# Patient Record
Sex: MALE | Race: WHITE | NOT HISPANIC OR LATINO | Employment: FULL TIME | URBAN - METROPOLITAN AREA
[De-identification: names, ages, dates, MRNs, and addresses within clinical notes are randomized per-mention and may not be internally consistent; named-entity substitution may affect disease eponyms.]

---

## 2018-09-29 ENCOUNTER — ANESTHESIA (OUTPATIENT)
Dept: SURGERY | Facility: CLINIC | Age: 53
End: 2018-09-29
Payer: COMMERCIAL

## 2018-09-29 ENCOUNTER — SURGERY (OUTPATIENT)
Age: 53
End: 2018-09-29
Payer: COMMERCIAL

## 2018-09-29 ENCOUNTER — HOSPITAL ENCOUNTER (OUTPATIENT)
Facility: CLINIC | Age: 53
Discharge: HOME | End: 2018-09-30
Attending: EMERGENCY MEDICINE | Admitting: SURGERY
Payer: COMMERCIAL

## 2018-09-29 ENCOUNTER — ANESTHESIA EVENT (OUTPATIENT)
Dept: SURGERY | Facility: CLINIC | Age: 53
End: 2018-09-29
Payer: COMMERCIAL

## 2018-09-29 ENCOUNTER — APPOINTMENT (OUTPATIENT)
Dept: CT IMAGING | Facility: CLINIC | Age: 53
End: 2018-09-29
Attending: EMERGENCY MEDICINE
Payer: COMMERCIAL

## 2018-09-29 VITALS
BODY MASS INDEX: 30.38 KG/M2 | DIASTOLIC BLOOD PRESSURE: 74 MMHG | OXYGEN SATURATION: 97 % | HEART RATE: 72 BPM | SYSTOLIC BLOOD PRESSURE: 116 MMHG | RESPIRATION RATE: 14 BRPM | TEMPERATURE: 96.6 F | WEIGHT: 194 LBS

## 2018-09-29 DIAGNOSIS — K35.80 ACUTE APPENDICITIS, UNSPECIFIED ACUTE APPENDICITIS TYPE: ICD-10-CM

## 2018-09-29 DIAGNOSIS — G89.18 ACUTE POST-OPERATIVE PAIN: Primary | ICD-10-CM

## 2018-09-29 LAB
ALBUMIN SERPL-MCNC: 4.1 G/DL (ref 3.4–5)
ALP SERPL-CCNC: 97 U/L (ref 40–150)
ALT SERPL W P-5'-P-CCNC: 26 U/L (ref 0–70)
ANION GAP SERPL CALCULATED.3IONS-SCNC: 7 MMOL/L (ref 3–14)
AST SERPL W P-5'-P-CCNC: 12 U/L (ref 0–45)
BASOPHILS # BLD AUTO: 0 10E9/L (ref 0–0.2)
BASOPHILS NFR BLD AUTO: 0.2 %
BILIRUB SERPL-MCNC: 0.9 MG/DL (ref 0.2–1.3)
BUN SERPL-MCNC: 16 MG/DL (ref 7–30)
CALCIUM SERPL-MCNC: 8.7 MG/DL (ref 8.5–10.1)
CHLORIDE SERPL-SCNC: 103 MMOL/L (ref 94–109)
CO2 SERPL-SCNC: 29 MMOL/L (ref 20–32)
CREAT SERPL-MCNC: 1 MG/DL (ref 0.66–1.25)
DIFFERENTIAL METHOD BLD: ABNORMAL
EOSINOPHIL # BLD AUTO: 0.1 10E9/L (ref 0–0.7)
EOSINOPHIL NFR BLD AUTO: 0.4 %
ERYTHROCYTE [DISTWIDTH] IN BLOOD BY AUTOMATED COUNT: 13.3 % (ref 10–15)
GFR SERPL CREATININE-BSD FRML MDRD: 78 ML/MIN/1.7M2
GLUCOSE SERPL-MCNC: 106 MG/DL (ref 70–99)
HCT VFR BLD AUTO: 47.4 % (ref 40–53)
HGB BLD-MCNC: 16.6 G/DL (ref 13.3–17.7)
IMM GRANULOCYTES # BLD: 0 10E9/L (ref 0–0.4)
IMM GRANULOCYTES NFR BLD: 0.3 %
LACTATE BLD-SCNC: 1.5 MMOL/L (ref 0.7–2)
LYMPHOCYTES # BLD AUTO: 1.2 10E9/L (ref 0.8–5.3)
LYMPHOCYTES NFR BLD AUTO: 9.2 %
MCH RBC QN AUTO: 30 PG (ref 26.5–33)
MCHC RBC AUTO-ENTMCNC: 35 G/DL (ref 31.5–36.5)
MCV RBC AUTO: 86 FL (ref 78–100)
MONOCYTES # BLD AUTO: 0.8 10E9/L (ref 0–1.3)
MONOCYTES NFR BLD AUTO: 6.3 %
NEUTROPHILS # BLD AUTO: 11 10E9/L (ref 1.6–8.3)
NEUTROPHILS NFR BLD AUTO: 83.6 %
NRBC # BLD AUTO: 0 10*3/UL
NRBC BLD AUTO-RTO: 0 /100
PLATELET # BLD AUTO: 187 10E9/L (ref 150–450)
POTASSIUM SERPL-SCNC: 3.8 MMOL/L (ref 3.4–5.3)
PROT SERPL-MCNC: 8 G/DL (ref 6.8–8.8)
RBC # BLD AUTO: 5.54 10E12/L (ref 4.4–5.9)
SODIUM SERPL-SCNC: 139 MMOL/L (ref 133–144)
WBC # BLD AUTO: 13.3 10E9/L (ref 4–11)

## 2018-09-29 PROCEDURE — 25800025 ZZH RX 258: Performed by: SURGERY

## 2018-09-29 PROCEDURE — 36000058 ZZH SURGERY LEVEL 3 EA 15 ADDTL MIN: Performed by: SURGERY

## 2018-09-29 PROCEDURE — 40000935 ZZH STATISTIC OUTPATIENT (NON-OBS) EVE

## 2018-09-29 PROCEDURE — 40000170 ZZH STATISTIC PRE-PROCEDURE ASSESSMENT II: Performed by: SURGERY

## 2018-09-29 PROCEDURE — 25000132 ZZH RX MED GY IP 250 OP 250 PS 637: Performed by: SURGERY

## 2018-09-29 PROCEDURE — 36000056 ZZH SURGERY LEVEL 3 1ST 30 MIN: Performed by: SURGERY

## 2018-09-29 PROCEDURE — 25000128 H RX IP 250 OP 636: Performed by: EMERGENCY MEDICINE

## 2018-09-29 PROCEDURE — 87040 BLOOD CULTURE FOR BACTERIA: CPT | Mod: 91 | Performed by: EMERGENCY MEDICINE

## 2018-09-29 PROCEDURE — 37000008 ZZH ANESTHESIA TECHNICAL FEE, 1ST 30 MIN: Performed by: SURGERY

## 2018-09-29 PROCEDURE — 83605 ASSAY OF LACTIC ACID: CPT | Performed by: EMERGENCY MEDICINE

## 2018-09-29 PROCEDURE — 37000009 ZZH ANESTHESIA TECHNICAL FEE, EACH ADDTL 15 MIN: Performed by: SURGERY

## 2018-09-29 PROCEDURE — 27210794 ZZH OR GENERAL SUPPLY STERILE: Performed by: SURGERY

## 2018-09-29 PROCEDURE — 99285 EMERGENCY DEPT VISIT HI MDM: CPT | Mod: 25

## 2018-09-29 PROCEDURE — 96361 HYDRATE IV INFUSION ADD-ON: CPT | Mod: 59

## 2018-09-29 PROCEDURE — 80053 COMPREHEN METABOLIC PANEL: CPT | Performed by: EMERGENCY MEDICINE

## 2018-09-29 PROCEDURE — 88304 TISSUE EXAM BY PATHOLOGIST: CPT | Performed by: SURGERY

## 2018-09-29 PROCEDURE — 25000125 ZZHC RX 250: Performed by: EMERGENCY MEDICINE

## 2018-09-29 PROCEDURE — 25000128 H RX IP 250 OP 636: Performed by: ANESTHESIOLOGY

## 2018-09-29 PROCEDURE — 88304 TISSUE EXAM BY PATHOLOGIST: CPT | Mod: 26 | Performed by: SURGERY

## 2018-09-29 PROCEDURE — 85025 COMPLETE CBC W/AUTO DIFF WBC: CPT | Performed by: EMERGENCY MEDICINE

## 2018-09-29 PROCEDURE — 25000125 ZZHC RX 250: Performed by: NURSE ANESTHETIST, CERTIFIED REGISTERED

## 2018-09-29 PROCEDURE — 25000566 ZZH SEVOFLURANE, EA 15 MIN: Performed by: SURGERY

## 2018-09-29 PROCEDURE — 96375 TX/PRO/DX INJ NEW DRUG ADDON: CPT

## 2018-09-29 PROCEDURE — 99203 OFFICE O/P NEW LOW 30 MIN: CPT | Mod: 57 | Performed by: SURGERY

## 2018-09-29 PROCEDURE — 71000012 ZZH RECOVERY PHASE 1 LEVEL 1 FIRST HR: Performed by: SURGERY

## 2018-09-29 PROCEDURE — 25000125 ZZHC RX 250: Performed by: SURGERY

## 2018-09-29 PROCEDURE — 40000934 ZZH STATISTIC OUTPATIENT (NON-OBS) DAY

## 2018-09-29 PROCEDURE — 74177 CT ABD & PELVIS W/CONTRAST: CPT

## 2018-09-29 PROCEDURE — 44970 LAPAROSCOPY APPENDECTOMY: CPT | Performed by: SURGERY

## 2018-09-29 PROCEDURE — 25000128 H RX IP 250 OP 636: Performed by: STUDENT IN AN ORGANIZED HEALTH CARE EDUCATION/TRAINING PROGRAM

## 2018-09-29 PROCEDURE — 25000128 H RX IP 250 OP 636: Performed by: NURSE ANESTHETIST, CERTIFIED REGISTERED

## 2018-09-29 PROCEDURE — 96374 THER/PROPH/DIAG INJ IV PUSH: CPT | Mod: 59

## 2018-09-29 RX ORDER — METOCLOPRAMIDE HYDROCHLORIDE 5 MG/ML
10 INJECTION INTRAMUSCULAR; INTRAVENOUS EVERY 6 HOURS PRN
Status: DISCONTINUED | OUTPATIENT
Start: 2018-09-29 | End: 2018-09-30 | Stop reason: HOSPADM

## 2018-09-29 RX ORDER — SODIUM CHLORIDE, SODIUM LACTATE, POTASSIUM CHLORIDE, CALCIUM CHLORIDE 600; 310; 30; 20 MG/100ML; MG/100ML; MG/100ML; MG/100ML
INJECTION, SOLUTION INTRAVENOUS CONTINUOUS
Status: DISCONTINUED | OUTPATIENT
Start: 2018-09-29 | End: 2018-09-29 | Stop reason: HOSPADM

## 2018-09-29 RX ORDER — ONDANSETRON 2 MG/ML
4 INJECTION INTRAMUSCULAR; INTRAVENOUS EVERY 30 MIN PRN
Status: DISCONTINUED | OUTPATIENT
Start: 2018-09-29 | End: 2018-09-29

## 2018-09-29 RX ORDER — FENTANYL CITRATE 50 UG/ML
25-50 INJECTION, SOLUTION INTRAMUSCULAR; INTRAVENOUS
Status: CANCELLED | OUTPATIENT
Start: 2018-09-29

## 2018-09-29 RX ORDER — ONDANSETRON 4 MG/1
4 TABLET, ORALLY DISINTEGRATING ORAL EVERY 30 MIN PRN
Status: CANCELLED | OUTPATIENT
Start: 2018-09-29

## 2018-09-29 RX ORDER — SODIUM CHLORIDE, SODIUM LACTATE, POTASSIUM CHLORIDE, CALCIUM CHLORIDE 600; 310; 30; 20 MG/100ML; MG/100ML; MG/100ML; MG/100ML
INJECTION, SOLUTION INTRAVENOUS CONTINUOUS
Status: CANCELLED | OUTPATIENT
Start: 2018-09-29

## 2018-09-29 RX ORDER — IOPAMIDOL 755 MG/ML
98 INJECTION, SOLUTION INTRAVASCULAR ONCE
Status: COMPLETED | OUTPATIENT
Start: 2018-09-29 | End: 2018-09-29

## 2018-09-29 RX ORDER — HYDROMORPHONE HYDROCHLORIDE 1 MG/ML
.3-.5 INJECTION, SOLUTION INTRAMUSCULAR; INTRAVENOUS; SUBCUTANEOUS EVERY 5 MIN PRN
Status: CANCELLED | OUTPATIENT
Start: 2018-09-29

## 2018-09-29 RX ORDER — OXYCODONE AND ACETAMINOPHEN 5; 325 MG/1; MG/1
1-2 TABLET ORAL EVERY 4 HOURS PRN
Status: DISCONTINUED | OUTPATIENT
Start: 2018-09-29 | End: 2018-09-30 | Stop reason: HOSPADM

## 2018-09-29 RX ORDER — GLYCOPYRROLATE 0.2 MG/ML
INJECTION, SOLUTION INTRAMUSCULAR; INTRAVENOUS PRN
Status: DISCONTINUED | OUTPATIENT
Start: 2018-09-29 | End: 2018-09-29

## 2018-09-29 RX ORDER — METOCLOPRAMIDE 10 MG/1
10 TABLET ORAL EVERY 6 HOURS PRN
Status: DISCONTINUED | OUTPATIENT
Start: 2018-09-29 | End: 2018-09-30 | Stop reason: HOSPADM

## 2018-09-29 RX ORDER — IBUPROFEN 600 MG/1
600 TABLET, FILM COATED ORAL EVERY 6 HOURS PRN
Status: DISCONTINUED | OUTPATIENT
Start: 2018-09-29 | End: 2018-09-30 | Stop reason: HOSPADM

## 2018-09-29 RX ORDER — LIDOCAINE HYDROCHLORIDE 20 MG/ML
INJECTION, SOLUTION INFILTRATION; PERINEURAL PRN
Status: DISCONTINUED | OUTPATIENT
Start: 2018-09-29 | End: 2018-09-29

## 2018-09-29 RX ORDER — ONDANSETRON 2 MG/ML
INJECTION INTRAMUSCULAR; INTRAVENOUS PRN
Status: DISCONTINUED | OUTPATIENT
Start: 2018-09-29 | End: 2018-09-29

## 2018-09-29 RX ORDER — NALOXONE HYDROCHLORIDE 0.4 MG/ML
.1-.4 INJECTION, SOLUTION INTRAMUSCULAR; INTRAVENOUS; SUBCUTANEOUS
Status: DISCONTINUED | OUTPATIENT
Start: 2018-09-29 | End: 2018-09-29

## 2018-09-29 RX ORDER — FENTANYL CITRATE 50 UG/ML
INJECTION, SOLUTION INTRAMUSCULAR; INTRAVENOUS PRN
Status: DISCONTINUED | OUTPATIENT
Start: 2018-09-29 | End: 2018-09-29

## 2018-09-29 RX ORDER — AMOXICILLIN 250 MG
1-2 CAPSULE ORAL 2 TIMES DAILY
Qty: 30 TABLET | Refills: 0 | Status: SHIPPED | OUTPATIENT
Start: 2018-09-29 | End: 2018-10-03

## 2018-09-29 RX ORDER — PROCHLORPERAZINE MALEATE 10 MG
10 TABLET ORAL EVERY 6 HOURS PRN
Status: DISCONTINUED | OUTPATIENT
Start: 2018-09-29 | End: 2018-09-30 | Stop reason: HOSPADM

## 2018-09-29 RX ORDER — ASPIRIN 325 MG
325 TABLET ORAL 2 TIMES DAILY PRN
Status: DISCONTINUED | OUTPATIENT
Start: 2018-09-29 | End: 2018-09-30 | Stop reason: HOSPADM

## 2018-09-29 RX ORDER — PIPERACILLIN SODIUM, TAZOBACTAM SODIUM 3; .375 G/15ML; G/15ML
3.38 INJECTION, POWDER, LYOPHILIZED, FOR SOLUTION INTRAVENOUS ONCE
Status: COMPLETED | OUTPATIENT
Start: 2018-09-29 | End: 2018-09-29

## 2018-09-29 RX ORDER — NALOXONE HYDROCHLORIDE 0.4 MG/ML
.1-.4 INJECTION, SOLUTION INTRAMUSCULAR; INTRAVENOUS; SUBCUTANEOUS
Status: DISCONTINUED | OUTPATIENT
Start: 2018-09-29 | End: 2018-09-30 | Stop reason: HOSPADM

## 2018-09-29 RX ORDER — OXYCODONE AND ACETAMINOPHEN 5; 325 MG/1; MG/1
1-2 TABLET ORAL EVERY 4 HOURS PRN
Qty: 20 TABLET | Refills: 0 | Status: SHIPPED | OUTPATIENT
Start: 2018-09-29 | End: 2018-10-03

## 2018-09-29 RX ORDER — ONDANSETRON 2 MG/ML
4 INJECTION INTRAMUSCULAR; INTRAVENOUS EVERY 30 MIN PRN
Status: CANCELLED | OUTPATIENT
Start: 2018-09-29

## 2018-09-29 RX ORDER — ONDANSETRON 4 MG/1
4 TABLET, ORALLY DISINTEGRATING ORAL EVERY 6 HOURS PRN
Status: DISCONTINUED | OUTPATIENT
Start: 2018-09-29 | End: 2018-09-30 | Stop reason: HOSPADM

## 2018-09-29 RX ORDER — NEOSTIGMINE METHYLSULFATE 1 MG/ML
VIAL (ML) INJECTION PRN
Status: DISCONTINUED | OUTPATIENT
Start: 2018-09-29 | End: 2018-09-29

## 2018-09-29 RX ORDER — PROPOFOL 10 MG/ML
INJECTION, EMULSION INTRAVENOUS PRN
Status: DISCONTINUED | OUTPATIENT
Start: 2018-09-29 | End: 2018-09-29

## 2018-09-29 RX ORDER — DEXAMETHASONE SODIUM PHOSPHATE 4 MG/ML
INJECTION, SOLUTION INTRA-ARTICULAR; INTRALESIONAL; INTRAMUSCULAR; INTRAVENOUS; SOFT TISSUE PRN
Status: DISCONTINUED | OUTPATIENT
Start: 2018-09-29 | End: 2018-09-29

## 2018-09-29 RX ORDER — HYDROMORPHONE HYDROCHLORIDE 1 MG/ML
0.5 INJECTION, SOLUTION INTRAMUSCULAR; INTRAVENOUS; SUBCUTANEOUS
Status: COMPLETED | OUTPATIENT
Start: 2018-09-29 | End: 2018-09-29

## 2018-09-29 RX ORDER — MAGNESIUM HYDROXIDE 1200 MG/15ML
LIQUID ORAL PRN
Status: DISCONTINUED | OUTPATIENT
Start: 2018-09-29 | End: 2018-09-29 | Stop reason: HOSPADM

## 2018-09-29 RX ORDER — ACETAMINOPHEN 325 MG/1
650 TABLET ORAL EVERY 6 HOURS PRN
Status: DISCONTINUED | OUTPATIENT
Start: 2018-09-29 | End: 2018-09-30 | Stop reason: HOSPADM

## 2018-09-29 RX ORDER — HYDROMORPHONE HYDROCHLORIDE 1 MG/ML
0.2 INJECTION, SOLUTION INTRAMUSCULAR; INTRAVENOUS; SUBCUTANEOUS
Status: DISCONTINUED | OUTPATIENT
Start: 2018-09-29 | End: 2018-09-30 | Stop reason: HOSPADM

## 2018-09-29 RX ORDER — ONDANSETRON 2 MG/ML
4 INJECTION INTRAMUSCULAR; INTRAVENOUS EVERY 6 HOURS PRN
Status: DISCONTINUED | OUTPATIENT
Start: 2018-09-29 | End: 2018-09-30 | Stop reason: HOSPADM

## 2018-09-29 RX ADMIN — SODIUM CHLORIDE, POTASSIUM CHLORIDE, SODIUM LACTATE AND CALCIUM CHLORIDE: 600; 310; 30; 20 INJECTION, SOLUTION INTRAVENOUS at 11:24

## 2018-09-29 RX ADMIN — ONDANSETRON 4 MG: 2 INJECTION INTRAMUSCULAR; INTRAVENOUS at 12:21

## 2018-09-29 RX ADMIN — BUPIVACAINE HYDROCHLORIDE AND EPINEPHRINE BITARTRATE 15 ML: 5; .005 INJECTION, SOLUTION EPIDURAL; INTRACAUDAL; PERINEURAL at 12:46

## 2018-09-29 RX ADMIN — ONDANSETRON 4 MG: 2 INJECTION INTRAMUSCULAR; INTRAVENOUS at 08:38

## 2018-09-29 RX ADMIN — SODIUM CHLORIDE, PRESERVATIVE FREE 70 ML: 5 INJECTION INTRAVENOUS at 09:18

## 2018-09-29 RX ADMIN — PIPERACILLIN SODIUM,TAZOBACTAM SODIUM 3.38 G: 3; .375 INJECTION, POWDER, FOR SOLUTION INTRAVENOUS at 10:34

## 2018-09-29 RX ADMIN — IOPAMIDOL 98 ML: 755 INJECTION, SOLUTION INTRAVENOUS at 09:18

## 2018-09-29 RX ADMIN — SODIUM CHLORIDE, POTASSIUM CHLORIDE, SODIUM LACTATE AND CALCIUM CHLORIDE: 600; 310; 30; 20 INJECTION, SOLUTION INTRAVENOUS at 12:25

## 2018-09-29 RX ADMIN — GLYCOPYRROLATE 0.7 MG: 0.2 INJECTION, SOLUTION INTRAMUSCULAR; INTRAVENOUS at 12:44

## 2018-09-29 RX ADMIN — SODIUM CHLORIDE 1000 ML: 900 IRRIGANT IRRIGATION at 12:07

## 2018-09-29 RX ADMIN — PROPOFOL 200 MG: 10 INJECTION, EMULSION INTRAVENOUS at 11:54

## 2018-09-29 RX ADMIN — ASPIRIN 325 MG ORAL TABLET 325 MG: 325 PILL ORAL at 18:24

## 2018-09-29 RX ADMIN — FENTANYL CITRATE 200 MCG: 50 INJECTION, SOLUTION INTRAMUSCULAR; INTRAVENOUS at 11:54

## 2018-09-29 RX ADMIN — SODIUM CHLORIDE 1000 ML: 9 INJECTION, SOLUTION INTRAVENOUS at 10:31

## 2018-09-29 RX ADMIN — LIDOCAINE HYDROCHLORIDE 100 MG: 20 INJECTION, SOLUTION INFILTRATION; PERINEURAL at 11:54

## 2018-09-29 RX ADMIN — HYDROMORPHONE HYDROCHLORIDE 0.5 MG: 1 INJECTION, SOLUTION INTRAMUSCULAR; INTRAVENOUS; SUBCUTANEOUS at 08:44

## 2018-09-29 RX ADMIN — DEXAMETHASONE SODIUM PHOSPHATE 4 MG: 4 INJECTION, SOLUTION INTRA-ARTICULAR; INTRALESIONAL; INTRAMUSCULAR; INTRAVENOUS; SOFT TISSUE at 11:59

## 2018-09-29 RX ADMIN — ROCURONIUM BROMIDE 40 MG: 10 INJECTION INTRAVENOUS at 11:54

## 2018-09-29 RX ADMIN — SODIUM CHLORIDE 1000 ML: 9 INJECTION, SOLUTION INTRAVENOUS at 08:37

## 2018-09-29 RX ADMIN — ONDANSETRON 4 MG: 2 INJECTION INTRAMUSCULAR; INTRAVENOUS at 19:31

## 2018-09-29 RX ADMIN — PROCHLORPERAZINE EDISYLATE 10 MG: 5 INJECTION INTRAMUSCULAR; INTRAVENOUS at 21:12

## 2018-09-29 RX ADMIN — NEOSTIGMINE METHYLSULFATE 4.5 MG: 1 INJECTION, SOLUTION INTRAVENOUS at 12:44

## 2018-09-29 ASSESSMENT — ENCOUNTER SYMPTOMS
VOMITING: 0
ABDOMINAL PAIN: 1
DIARRHEA: 0
NAUSEA: 0

## 2018-09-29 ASSESSMENT — LIFESTYLE VARIABLES: TOBACCO_USE: 1

## 2018-09-29 NOTE — PHARMACY-ADMISSION MEDICATION HISTORY
Admission medication history interview status for the 9/29/2018  admission is complete. See EPIC admission navigator for prior to admission medications     Medication history source reliability:Good    Actions taken by pharmacist (provider contacted, etc): Interviewed patient     Additional medication history information not noted on PTA med list :None    Medication reconciliation/reorder completed by provider prior to medication history? No    Time spent in this activity: 5 minutes    Prior to Admission medications    Medication Sig Last Dose Taking? Auth Provider   ASPIRIN PO Take 325 mg by mouth daily as needed  prn Yes Reported, Patient

## 2018-09-29 NOTE — ED PROVIDER NOTES
History     Chief Complaint:  Abdominal Pain    HPI   Cherry Richards is a 53 year old male who presents to the emergency department today for evaluation of abdominal pain. The patient started having right sided abdominal pain last night at 2000. This began as intermittent, but since around 0400 this morning the pain has been constant. The intensity has been increasing since the onset. He further notes right sided testicular pain, but denies nausea, vomiting, and diarrhea    Allergies:  No Known Drug Allergies    Medications:    Aspirin PO    Past Medical History:    History reviewed. No pertinent past medical history.    Past Surgical History:    History reviewed. No pertinent surgical history.    Family History:    History reviewed. No pertinent family history.    Social History:  The patient was accompanied to the ED by his son.  Smoking Status: Never Smoker  Smokeless Tobacco: Never Used  Alcohol Use: Negative   Marital Status:       Review of Systems   Gastrointestinal: Positive for abdominal pain. Negative for diarrhea, nausea and vomiting.   Genitourinary: Positive for testicular pain.   All other systems reviewed and are negative.    Physical Exam     Patient Vitals for the past 24 hrs:   BP Temp Temp src Pulse Heart Rate Resp SpO2 Weight   09/29/18 1320 150/84 - - - 68 13 100 % -   09/29/18 1310 145/74 98.4  F (36.9  C) Temporal - 67 8 99 % -   09/29/18 1110 151/83 97.5  F (36.4  C) Temporal - 64 16 97 % -   09/29/18 1030 147/84 - - - 58 17 96 % -   09/29/18 1015 - - - - - - 98 % -   09/29/18 1000 (!) 159/97 - - - - - 96 % -   09/29/18 0900 136/90 - - - - - 95 % -   09/29/18 0846 - - - - - - 95 % -   09/29/18 0845 154/79 - - - - - - -   09/29/18 0814 184/79 98.7  F (37.1  C) Oral 80 - 18 97 % 88 kg (194 lb)      Physical Exam  Vitals: reviewed by me  General: Pt seen on [facility], pleasant, cooperative, and alert to conversation, uncomfortable appearing, minimally diaphoretic and pale.  Eyes:  Tracking well, clear conjunctiva BL  ENT: MMM, midline trachea.   Lungs:   No tachypnea, no accessory muscle use. No respiratory distress.   CV: Rate as above, regular rhythm.    Abd: Right lower quadrant with tenderness to palpation and focal guarding.  Mild rebound noted as well.  No tenderness to the other areas of his abdomen.  MSK: no peripheral edema or joint effusion.  No evidence of trauma  Skin: No rash, normal turgor and temperature  Neuro: Clear speech and no facial droop.  Psych: Not RIS, no e/o AH/VH      Emergency Department Course     Imaging:  Radiology findings were communicated with the patient who voiced understanding of the findings.    CT Abdomen Pelvis w Contrast  1. Acute appendicitis. No abscess, free air, or free fluid.  2. Probable cholesterol type gallstone with mild gallbladder wall  thickening that could represent cholecystitis. No biliary dilatation.  Reading per radiology    Laboratory:  Laboratory findings were communicated with the patient who voiced understanding of the findings.    CBC: WBC 13.3, HGB 16.6,   CMP: Glucose 106,  o/w WNL (Creatinine 1.00)  Lactic acid: 1.5    Interventions:  0837 NS, 1 L, IV  0838 Zofran 4 mg IV  0844 Dilaudid 0.5 mg IV  1034 Zosyn 3.375 g IV    Emergency Department Course:    0820 Nursing notes and vitals reviewed.    0825 I performed an exam of the patient as documented above.     0843 IV was inserted and blood was drawn for laboratory testing, results above.    0912 The patient was sent for a CT while in the emergency department, results above.      1005 I spoke with Dr. Thapa regarding patient's presentation, findings, and plan of care.     1010 I personally reviewed the imaging and lab results with the patient and answered all related questions prior to admission.    Impression & Plan      Medical Decision Making:  Cherry Richards is a 53 year old male who presents to the emergency department today for evaluation of right sided  abdominal pain, likely due to his appendicitis. I believe this diagnosis is supported by the presence of an inflamed appendix in a CT scan as well as no other abnormalities noted. His labs are reassuringly noted, he has a normal testicular exam with no evidence of testicular torsion. No perforation noted, no evidence of septic shock. Will provide antibiotics, IV fluids, and surgery. I spoke to Dr. Thapa who generously agreed to accept the patient.    Diagnosis:    ICD-10-CM    1. Acute post-operative pain G89.18 oxyCODONE-acetaminophen (PERCOCET) 5-325 MG per tablet   2. Acute appendicitis, unspecified acute appendicitis type K35.80 Blood culture     Blood culture     senna-docusate (SENOKOT-S;PERICOLACE) 8.6-50 MG per tablet     Disposition:   Admission    Scribe Disclosure:  Freddie WREN, am serving as a scribe at 8:35 AM on 9/29/2018 to document services personally performed by Priyank Brito MD based on my observations and the provider's statements to me.      EMERGENCY DEPARTMENT       Priyank Brito MD  09/29/18 7527

## 2018-09-29 NOTE — PLAN OF CARE
Problem: Patient Care Overview  Goal: Plan of Care/Patient Progress Review  A&Ox4. VSS on 3 LPM O2. Lap sites CDI x3. C/o pain 3/10, declines pain meds. Ice over incision sites. Denies nausea. IS instruction provided, IS at bedside. Voided in PACU. Tolerating clears. Continue to monitor.

## 2018-09-29 NOTE — ED NOTES
Pt up to BR returned to room became acutely nauseated about to vomit and had vagal episode. Unresponsive for 15 sec pt pale, then pt arousable vss.  Dr henson into see pt A & O x3.  Pre op updated  Pt's nausea resolved will not give pain meds at this time

## 2018-09-29 NOTE — PROVIDER NOTIFICATION
Notified On-Call (Jc Florez MD) for Aspirin pain medication. Consulted with Pharmacist Arturo for dosage confirmation. Ordered TOVRB Aspirin 325 mg two time a day as needed.

## 2018-09-29 NOTE — IP AVS SNAPSHOT
MRN:[id number]                      After Visit Summary   9/29/2018    Cherry Richards    MRN: [id number]           Thank you!     Thank you for choosing [facility] for your care. Our goal is always to provide you with excellent care. Hearing back from our patients is one way we can continue to improve our services. Please take a few minutes to complete the written survey that you may receive in the mail after you visit with us. Thank you!        Patient Information     Date Of Birth          1965        Designated Caregiver       Most Recent Value    Caregiver    Will someone help with your care after discharge? yes    Name of designated caregiver tammy    Phone number of caregiver     Caregiver address [place]      About your hospital stay     You were admitted on:  September 29, 2018 [facility]    You were discharged on:  September 29, 2018       Who to Call     For medical emergencies, please call 911.  For non-urgent questions about your medical care, please call your primary care provider or clinic, [phone]  For questions related to your surgery, please call your surgery clinic        Attending Provider     Provider Priyank Churchill MD Emergency Medicine    Kassy Thapa MD Surgery       Primary Care Provider Office Phone # Fax #    Yadira [place] [phone]      Further instructions from your care team       [facility]  Discharge Instructions: Post-Operative Appendectomy    ACTIVITY    Increase your activity gradually.  Avoid strenuous physical activity or heavy lifting greater than 15-20 lbs. for 2-3 weeks.  You may climb stairs.    You may drive without restrictions when you are not using any prescription pain medication and comfortable in a car.    You may return to work/school when you are comfortable without  any prescription pain medication.    WOUND CARE    You may remove your bandage and shower 48 hours after the surgery.  Pat your incisions dry and leave open to air.  Re-apply dressing (Band-aid or gauze/tape) as needed for drainage.    You may have steri-strips (looks like tape) or Dermabond (looks like glue) on your incision.  Leave it alone, it will peel up and fall off on its own.     Do not soak your incisions in a tub or pool for 2 weeks.     DIET    Return to the diet you were on before surgery.    Drink plenty of liquids to stay hydrated.    PAIN    Expect some tenderness and discomfort at the incision sites.  Use the prescribed pain medication at your discretion.  Expect gradual resolution of your pain over several days.    You may take ibuprofen with food (unless you have been told not to) instead of or in addition to your prescribed pain medication.  If you are taking Norco or Percocet, do not take any additional acetaminophen/APAP/Tylenol.    Do not drink alcohol or drive while you are taking pain medications.    You may apply ice to your incisions in 20 minute intervals as needed for the next 48 hours.  After that time, consider switching to heat if you prefer.    RETURN APPOINTMENT    Expect a follow-up call in 2-4 weeks.  If you have concerns or have not received a call by then, please call the office at [phone] to schedule an appointment. We are located at [address] [place] [address].    CALL OUR OFFICE IF YOU HAVE:     Chills or fever above 101.5 F.    Increased redness or drainage at your incisions.    Significant bleeding.    Pain not relieved by your pain medication or rest.    Increasing pain after the first 48 hours.    Any other concerns or questions.    HELPFUL HINTS    Pain medications can cause constipation.  Limit use when possible.  Take over the counter stool softener/stimulant, such as Colace or Senna, with plenty of water.  You may take a mild over the  "counter laxative, such as Miralax or a suppository, as needed.     For laparoscopic surgery, you may have shoulder or upper back discomfort due to the gas used in surgery.  This is temporary and should resolve in 48-72 hours.  Short, frequent walks may help with this.  Revised 2017      Pending Results     Date and Time Order Name Status Description    2018 0959 Blood culture Preliminary     2018 0959 Blood culture Preliminary             Admission Information     Date & Time Provider Department Dept. Phone    2018 Kassy Thapa MD [facility] [phone]      Your Vitals Were     Blood Pressure Pulse Temperature Respirations Weight Pulse Oximetry    130/86 72 99  F (37.2  C) (Oral) 18 88 kg (194 lb) 95%    BMI (Body Mass Index)                   30.38 kg/m2           [employer] Information     [employer] lets you send messages to your doctor, view your test results, renew your prescriptions, schedule appointments and more. To sign up, go to [email or web address] . Click on \"Log in\" on the left side of the screen, which will take you to the Welcome page. Then click on \"Sign up Now\" on the right side of the page.     You will be asked to enter the access code listed below, as well as some personal information. Please follow the directions to create your username and password.     Your access code is: [email or web address]  Expires: 2018  3:26 PM     Your access code will  in 90 days. If you need help or a new code, please call your [facility] clinic or [phone].        Care EveryWhere ID     This is your Care EveryWhere ID. This could be used by other organizations to access your [facility] medical records  [id number]        Equal Access to Services     [facility] AAMIR : Tanya Brito, cherelle miranda, james zhao. So [facility] [phone].    ATENCIÓN: Si habla español, tiene a beal disposición servicios " ivan de asistencia lingüística. Jelena nayak [phone].    We comply with applicable federal civil rights laws and [place] laws. We do not discriminate on the basis of race, color, national origin, age, disability, sex, sexual orientation, or gender identity.               Review of your medicines      START taking        Dose / Directions    oxyCODONE-acetaminophen 5-325 MG per tablet   Commonly known as:  PERCOCET   Used for:  Acute post-operative pain        Dose:  1-2 tablet   Take 1-2 tablets by mouth every 4 hours as needed for pain (moderate to severe)   Quantity:  20 tablet   Refills:  0       senna-docusate 8.6-50 MG per tablet   Commonly known as:  SENOKOT-S;PERICOLACE   Used for:  Acute appendicitis, unspecified acute appendicitis type        Dose:  1-2 tablet   Take 1-2 tablets by mouth 2 times daily Take while on oral narcotics to prevent or treat constipation.   Quantity:  30 tablet   Refills:  0         CONTINUE these medicines which have NOT CHANGED        Dose / Directions    ASPIRIN PO        Dose:  325 mg   Take 325 mg by mouth daily as needed   Refills:  0            Where to get your medicines      These medications were sent to [id number] - [place], [place] - [address] AT [facility] [place] [address]  [address], [place] [address]     Phone:  [phone]     senna-docusate 8.6-50 MG per tablet         Some of these will need a paper prescription and others can be bought over the counter. Ask your nurse if you have questions.     Bring a paper prescription for each of these medications     oxyCODONE-acetaminophen 5-325 MG per tablet                Protect others around you: Learn how to safely use, store and throw away your medicines at www.disposemymeds.org.        Information about OPIOIDS     PRESCRIPTION OPIOIDS: WHAT YOU NEED TO KNOW   We gave you an opioid (narcotic) pain medicine. It is important to manage your pain, but opioids are not always  the best choice. You should first try all the other options your care team gave you. Take this medicine for as short a time (and as few doses) as possible.    Some activities can increase your pain, such as bandage changes or therapy sessions. It may help to take your pain medicine 30 to 60 minutes before these activities. Reduce your stress by getting enough sleep, working on hobbies you enjoy and practicing relaxation or meditation. Talk to your care team about ways to manage your pain beyond prescription opioids.    These medicines have risks:    DO NOT drive when on new or higher doses of pain medicine. These medicines can affect your alertness and reaction times, and you could be arrested for driving under the influence (DUI). If you need to use opioids long-term, talk to your care team about driving.    DO NOT operate heavy machinery    DO NOT do any other dangerous activities while taking these medicines.    DO NOT drink any alcohol while taking these medicines.     If the opioid prescribed includes acetaminophen, DO NOT take with any other medicines that contain acetaminophen. Read all labels carefully. Look for the word  acetaminophen  or  Tylenol.  Ask your pharmacist if you have questions or are unsure.    You can get addicted to pain medicines, especially if you have a history of addiction (chemical, alcohol or substance dependence). Talk to your care team about ways to reduce this risk.    All opioids tend to cause constipation. Drink plenty of water and eat foods that have a lot of fiber, such as fruits, vegetables, prune juice, apple juice and high-fiber cereal. Take a laxative (Miralax, milk of magnesia, Colace, Senna) if you don t move your bowels at least every other day. Other side effects include upset stomach, sleepiness, dizziness, throwing up, tolerance (needing more of the medicine to have the same effect), physical dependence and slowed breathing.    Store your pills in a secure place,  locked if possible. We will not replace any lost or stolen medicine. If you don t finish your medicine, please throw away (dispose) as directed by your pharmacist. The [place] Pollution Control Agency has more information about safe disposal: https://www.pca.[facility].[place].us/living-green/managing-unwanted-medications             Medication List: This is a list of all your medications and when to take them. Check marks below indicate your daily home schedule. Keep this list as a reference.      Medications           Morning Afternoon Evening Bedtime As Needed    ASPIRIN PO   Take 325 mg by mouth daily as needed   Last time this was given:  325 mg on 9/29/2018  6:24 PM                                oxyCODONE-acetaminophen 5-325 MG per tablet   Commonly known as:  PERCOCET   Take 1-2 tablets by mouth every 4 hours as needed for pain (moderate to severe)                                   senna-docusate 8.6-50 MG per tablet   Commonly known as:  SENOKOT-S;PERICOLACE   Take 1-2 tablets by mouth 2 times daily Take while on oral narcotics to prevent or treat constipation.

## 2018-09-29 NOTE — ANESTHESIA PREPROCEDURE EVALUATION
Procedure: Procedure(s):  LAPAROSCOPIC APPENDECTOMY  Preop diagnosis: Unknown     No Known Allergies  History reviewed. No pertinent past medical history.  History reviewed. No pertinent surgical history.  Social History   Substance Use Topics     Smoking status: Never Smoker     Smokeless tobacco: Never Used     Alcohol use No     Prior to Admission medications    Medication Sig Start Date End Date Taking? Authorizing Provider   ASPIRIN PO Take 325 mg by mouth daily as needed    Yes Reported, Patient     Current Facility-Administered Medications Ordered in Epic   Medication Dose Route Frequency Last Rate Last Dose     HYDROmorphone (DILAUDID) injection 1 mg  1 mg Intravenous Once PRN         ondansetron (ZOFRAN) injection 4 mg  4 mg Intravenous Q30 Min PRN   4 mg at 09/29/18 0838     Current Outpatient Prescriptions Ordered in Epic   Medication     ASPIRIN PO       Wt Readings from Last 1 Encounters:   09/29/18 88 kg (194 lb)     Temp Readings from Last 1 Encounters:   09/29/18 37.1  C (98.7  F) (Oral)     BP Readings from Last 6 Encounters:   09/29/18 147/84   12/15/15 124/72   12/04/15 130/80   01/15/15 140/80   09/05/13 118/78   05/28/13 118/80     Pulse Readings from Last 4 Encounters:   09/29/18 80   12/15/15 61   12/04/15 58   01/15/15 62     Resp Readings from Last 1 Encounters:   09/29/18 17     SpO2 Readings from Last 1 Encounters:   09/29/18 96%     Recent Labs   Lab Test  09/29/18   0830  09/05/13   0957   NA  139  141   POTASSIUM  3.8  4.0   CHLORIDE  103  104   CO2  29  27   ANIONGAP  7  9.7   GLC  106*  99   BUN  16  21   CR  1.00  1.10   TIFFANIE  8.7  8.2*     Recent Labs   Lab Test  09/29/18   0830  09/05/13   0957   AST  12  13   ALT  26  26   ALKPHOS  97  101   BILITOTAL  0.9  0.4     Recent Labs   Lab Test  09/29/18   0830  09/05/13   0957  05/28/13   1128   WBC  13.3*   --   9.5   HGB  16.6  16.2  15.6   PLT  187   --   198     No results for input(s): ABO, RH in the last 84796 hours.  No results  for input(s): INR, PTT in the last 75906 hours.   No results for input(s): TROPI in the last 76439 hours.  No results for input(s): PH, PCO2, PO2, HCO3 in the last 95698 hours.  No results for input(s): HCG in the last 83595 hours.  Recent Results (from the past 744 hour(s))   CT Abdomen Pelvis w Contrast    Narrative    CT ABDOMEN AND PELVIS WITH CONTRAST  9/29/2018 9:32 AM     HISTORY: Right lower quadrant pain with guarding, possible  appendicitis.     TECHNIQUE:  98 mL Isovue-370. Radiation dose for this scan was reduced  using automated exposure control, adjustment of the mA and/or kV  according to patient size, or iterative reconstruction technique.    COMPARISON: None.    FINDINGS:  Included lung bases are unremarkable.    There is a 0.6 cm probable hepatic cyst in the dome of the right lobe  of the liver. Low-attenuation subcentimeter liver lesion(s) compatible  with benign cysts or other benign lesions. No specific evaluation or  follow-up is recommended in a low risk patient. The gallbladder wall  appears mildly thickened and perhaps enhancing. There is likely a  cholesterol type gallstone in the gallbladder. The spleen and pancreas  are normal. No adrenal lesions. Kidneys are normal. No retroperitoneal  adenopathy or evidence of aortic aneurysm.    Scans through the pelvis demonstrate a normal-appearing bladder. No  adenopathy.    No evidence of diverticulitis. There is a small appendicolith at the  base of the appendix with moderate thickening of the appendix  measuring up to 1 cm in diameter with mild periappendiceal  inflammation consistent with acute appendicitis. This is best seen on  axial series 3 images 72 through 79. There is no evidence of abscess,  free fluid, or free air.      Impression    IMPRESSION:  1. Acute appendicitis. No abscess, free air, or free fluid.  2. Probable cholesterol type gallstone with mild gallbladder wall  thickening that could represent cholecystitis. No biliary  dilatation.       RECENT LABS:   ECG:   ECHO:     Anesthesia Evaluation     . Pt has not had prior anesthetic            ROS/MED HX    ENT/Pulmonary:     (+)tobacco use, Past use , . .   (-) sleep apnea   Neurologic:       Cardiovascular:        (-) hypertension   METS/Exercise Tolerance:     Hematologic:         Musculoskeletal:         GI/Hepatic:     (+) appendicitis,      (-) GERD   Renal/Genitourinary:         Endo:     (+) Obesity, .      Psychiatric:         Infectious Disease:         Malignancy:         Other:                     Physical Exam  Normal systems: cardiovascular and dental    Airway   Mallampati: I  Neck ROM: full    Dental     Cardiovascular       Pulmonary                     Anesthesia Plan      History & Physical Review  History and physical reviewed and following examination; no interval change.    ASA Status:  2 .    NPO Status:  > 8 hours    Plan for General and ETT with Intravenous induction. Maintenance will be Balanced.    PONV prophylaxis:  Ondansetron (or other 5HT-3)  Additional equipment: Videolaryngoscope      Postoperative Care  Postoperative pain management:  IV analgesics.      Consents  Anesthetic plan, risks, benefits and alternatives discussed with:  Patient..                          .

## 2018-09-29 NOTE — ANESTHESIA CARE TRANSFER NOTE
Patient: Cherry Richards    Procedure(s):  LAPAROSCOPIC APPENDECTOMY - Wound Class: III-Contaminated    Diagnosis: Unknown   Diagnosis Additional Information: No value filed.    Anesthesia Type:   General, ETT     Note:  Airway :Face Mask  Patient transferred to:PACU  Comments: Pt exhibits spontaneous respirations, follows commands, suctioned, extubated, dentition unchanged, exchanging well, transferred to pacu with 10L O2 via mask, all monitors and alarms on, report to Marita PRUITT Report: Identifed the Patient, Identified the Reponsible Provider, Reviewed the pertinent medical history, Discussed the surgical course, Reviewed Intra-OP anesthesia mangement and issues during anesthesia, Set expectations for post-procedure period and Allowed opportunity for questions and acknowledgement of understanding      Vitals: (Last set prior to Anesthesia Care Transfer)    CRNA VITALS  9/29/2018 1229 - 9/29/2018 1305      9/29/2018             NIBP: 153/72    Ht Rate: 79    SpO2: 99 %    EKG: Sinus rhythm                Electronically Signed By: PRASAD Fernandes CRNA  September 29, 2018  1:05 PM

## 2018-09-29 NOTE — IP AVS SNAPSHOT
[facility] Observation Unit    [address] [place] [id number]    Phone:  [phone]                                       After Visit Summary   9/29/2018    Cherry Richards    MRN: [id number]           After Visit Summary Signature Page     I have received my discharge instructions, and my questions have been answered. I have discussed any challenges I see with this plan with the nurse or doctor.    ..........................................................................................................................................  Patient/Patient Representative Signature      ..........................................................................................................................................  Patient Representative Print Name and Relationship to Patient    ..................................................               ................................................  Date                                   Time    ..........................................................................................................................................  Reviewed by Signature/Title    ...................................................              ..............................................  Date                                               Time          22EPIC Rev 08/18

## 2018-09-29 NOTE — CONSULTS
General Surgery Consultation    Cherry Richards MRN# [id number]   Age: 53 year old YOB: 1965     Date of Admission:  9/29/2018    Reason for consult:            Abdominal pain       Requesting physician:            Dr. Brito                  Chief Complaint:   Abdominal pain     History is obtained from the patient         History of Present Illness:   This patient is a 53 year old male who presents with right lower quadrant abdominal pain for 1 day. He reports the pain began at 8pm last night and has progressed throughout the night. Pain is 8/10 this morning. He reports movement makes the pain worse. He denies nausea and emesis. No fevers. No prior similar pain. He denies issues with diarrhea/constipation. Pain does radiate down his right groin. He had an abdominal ct which was concerning for appendicits. There is mention of gallbladder wall thickening on report. He has no right upper quadrant pain on exam and his LFTs are normal.             Past Medical History:   none          Past Surgical History:   History reviewed. No pertinent surgical history.          Social History:     Social History   Substance Use Topics     Smoking status: Never Smoker     Smokeless tobacco: Never Used     Alcohol use No             Family History:   Reviewed         Allergies:   No Known Allergies          Medications:     No current facility-administered medications on file prior to encounter.   No current outpatient prescriptions on file prior to encounter.    sodium chloride 0.9%  1,000 mL Intravenous Once     piperacillin-tazobactam  3.375 g Intravenous Once            Review of Systems:   A 10 point Review of Systems is negative other than noted in the HPI.          Physical Exam:   /84  Pulse 80  Temp 98.7  F (37.1  C) (Oral)  Resp 17  Wt 194 lb (88 kg)  SpO2 96%  BMI 30.38 kg/m2  General - Well developed, well nourished male in no apparent distress  Psych: normal affect, pleasant  HEENT:  Head  normocephalic and atraumatic, pupils equal and round, conjunctivae clear, no scleral icterus, external ears and nose normal  Neck: Supple without thyromegaly or masses  Lymphatic: No cervical, or supraclavicular lymphadenopathy  Lungs: Clear to auscultation bilaterally  Heart: regular rate and rhythm, no murmurs  Abdomen:   soft, flat, non-distended with moderate tenderness noted in the right lower quadrant. No rebound or guarding. no hepatosplenomegally.  Extremities: Warm without edema  Neurologic: nonfocal  Psychiatric: Mood and affect appropriate  Skin: Without lesions or rashes, or juandice         Data:     Lab Results   Component Value Date    WBC 13.3 09/29/2018     Lab Results   Component Value Date    HGB 16.6 09/29/2018     Lab Results   Component Value Date     09/29/2018     Last Basic Metabolic Panel:  Lab Results   Component Value Date     09/29/2018      Lab Results   Component Value Date    POTASSIUM 3.8 09/29/2018     Lab Results   Component Value Date    CHLORIDE 103 09/29/2018     Lab Results   Component Value Date    TIFFANIE 8.7 09/29/2018     Lab Results   Component Value Date    CO2 29 09/29/2018     Lab Results   Component Value Date    BUN 16 09/29/2018     Lab Results   Component Value Date    CR 1.00 09/29/2018     Lab Results   Component Value Date     09/29/2018       Liver Function Studies -   Recent Labs   Lab Test  09/29/18   0830   PROTTOTAL  8.0   ALBUMIN  4.1   BILITOTAL  0.9   ALKPHOS  97   AST  12   ALT  26       All labs and imaging was personally reviewed.     Imaging:    Results for orders placed or performed during the hospital encounter of 09/29/18   CT Abdomen Pelvis w Contrast    Narrative    CT ABDOMEN AND PELVIS WITH CONTRAST  9/29/2018 9:32 AM     HISTORY: Right lower quadrant pain with guarding, possible  appendicitis.     TECHNIQUE:  98 mL Isovue-370. Radiation dose for this scan was reduced  using automated exposure control, adjustment of the mA  and/or kV  according to patient size, or iterative reconstruction technique.    COMPARISON: None.    FINDINGS:  Included lung bases are unremarkable.    There is a 0.6 cm probable hepatic cyst in the dome of the right lobe  of the liver. Low-attenuation subcentimeter liver lesion(s) compatible  with benign cysts or other benign lesions. No specific evaluation or  follow-up is recommended in a low risk patient. The gallbladder wall  appears mildly thickened and perhaps enhancing. There is likely a  cholesterol type gallstone in the gallbladder. The spleen and pancreas  are normal. No adrenal lesions. Kidneys are normal. No retroperitoneal  adenopathy or evidence of aortic aneurysm.    Scans through the pelvis demonstrate a normal-appearing bladder. No  adenopathy.    No evidence of diverticulitis. There is a small appendicolith at the  base of the appendix with moderate thickening of the appendix  measuring up to 1 cm in diameter with mild periappendiceal  inflammation consistent with acute appendicitis. This is best seen on  axial series 3 images 72 through 79. There is no evidence of abscess,  free fluid, or free air.      Impression    IMPRESSION:  1. Acute appendicitis. No abscess, free air, or free fluid.  2. Probable cholesterol type gallstone with mild gallbladder wall  thickening that could represent cholecystitis. No biliary dilatation.             Assessment and Plan:   Assessment:   Cherry Richards is a 53 year old with acute appendicitis. No RUQ pain and normal LFTs. Plan for laparoscopic appendectomy.       Plan:   I discussed the pathophysiology of appendicitis and the need for surgical intervention. I have also discussed the risks, benefits, complications including but not limited to bleeding, infection, possible injury to the bowel or ureter, possible anastomotic dehiscence, possible post operative abscess, possible postoperative MI, PE, or other anesthetic complications. If one of these complications  did arise the patient could require further surgery. The patient thoroughly understood the conversation and will sign consent.     Kassy Thapa MD

## 2018-09-29 NOTE — OP NOTE
PREOPERATIVE DIAGNOSIS: Acute appendicitis.      POSTOPERATIVE DIAGNOSIS: Acute appendicitis.      PROCEDURE: Laparoscopic appendectomy.      SURGEON: Kassy Thapa MD     ASSISTANT:  Yaya Eldridge MD - [facility] Resident  The physician s assistant was medically necessary for their expertise in camera management, suctioning and retraction.    ANESTHESIA: GET.     COMPLICATIONS: None.     BLOOD LOSS: Minimal.     FINDINGS: Acute appendicitis,  perforation.     INDICATIONS:Cherry Richards is a 53 year old  with 1 day history of abdominal pain. An abdominal CT was performed which showed acute appendicitis as well as concern for possibly thickened gallbladder wall with stones. I explained the risks, benefits, complications including but not limited to bleeding, infection, possible need to open, possible postop hematoma, seroma, bowel or bladder injury, all which require additional procedures. The patient did agree and did sign consent.       DETAILS OF PROCEDURE: The patient was brought to the operating room per anesthesia, placed in supine position, intubated without difficulty. She was given perioperative antibiotics.  The patient was prepped and draped in the usual fashion using ChloraPrep and the surgical timeout was then performed, verifying the correct surgeon, site, procedure and patient. All in the room were in agreement.     A 5mm 0 degree laparoscope was inserted and the visiport used to obtain entrance to the abdomen. Insufflation was connected and the abdomen insufflated. Initial evaluation of the abdomen revealed moderate inflammation in the right lower quadrant and revealed no trocar injuries. The abdomen was insufflated with pressure of 15, which the patient tolerated well, a 2nd 5mm port was placed suprapubically and a 12mm port placed infraumbilically under direct visualization. The appendix was found and appeared indurated and very thickened throughout with fibrinous exudate surrounding. The  antimesenteric fat on the terminal ileum was plastered to the appendix. This was easily swept free with blunt dissection to reveal the base of the appendix.the appendix was not adherent to the lateral wall. The base of the cecum was not  thickened. The terminal ileum was normal. There was no perforation. A mesenteric window was created at the base of the cecum using a maryland grasper.  An Endo-XIMENA blue load was fired across this area without issues. Once this was done, the appendix was freed of the lateral wall with blunt dissection. The appendiceal mesentery was delineated and a XIMENA gray load was fired across this. The staple line was inspected and found to be hemostatic.  The appendix was placed an EndoCatch bag and removed through the umbilical trocar. The area was explored. Staple lines were completely intact. There was no bleeding. The right lower quadrant was irrigated with saline and suctioned.The pelvis showed no acute findings. We then positioned the patient in reverse trendelenberg and evaluated the gallbladder. It was soft and the wall appeared normal. No edema or fluid. As such, we elected to leave the gallbladder in place. All trocars were taken out under direct visualization. The fascia was closed with an 0 vicryl sutures using the pedro pablo nereida device in a figure of eight fashion.     Marcaine 0.5% injected to all the wounds. They were irrigated and closed with 4-0 Monocryl in subcuticular fashion.Steri strips and bandaids were applied. The patient tolerated the procedure well and was awoken from anesthesia and transferred to PACU in stable condition. All sponge, instrument, and needle counts were correct at the conclusion of the procedure.      Kassy Thapa MD  [facility]  [phone]

## 2018-09-29 NOTE — ED NOTES
[facility]  ED Nurse Handoff Report    ED Chief complaint: Abdominal Pain (lt lower quad pain since last night. no NVD)      ED Diagnosis:   Final diagnoses:   Acute appendicitis, unspecified acute appendicitis type       Code Status: Full Code    Allergies: No Known Allergies    Activity level - Baseline/Home:  Independent    Activity Level - Current:   Independent     Needed?: No    Isolation: No  Infection: Not Applicable  Bariatric?: No    Vital Signs:   Vitals:    09/29/18 0814 09/29/18 0845 09/29/18 0846 09/29/18 0900   BP: 184/79 154/79  136/90   Pulse: 80      Resp: 18      Temp: 98.7  F (37.1  C)      TempSrc: Oral      SpO2: 97%  95% 95%   Weight: 88 kg (194 lb)          Cardiac Rhythm: ,        Pain level: 0-10 Pain Scale: 8    Is this patient confused?: No   [place]rell - Suicide Severity Rating Scale Completed?  Yes  If yes, what color did the patient score?  White    Patient Report: Initial Complaint: pt had acute onset right sided abd last night at 8 pm.  Cont this am  Focused Assessment: see above  Tests Performed: see epic  Abnormal Results: see epic  Treatments provided: fluids, zofran, pain med and antibiotic    Family Comments: son here with pt    OBS brochure/video discussed/provided to patient/family: N/A              Name of person given brochure if not patient: na                Relationship to patient: na    ED Medications:   Medications   ondansetron (ZOFRAN) injection 4 mg (4 mg Intravenous Given 9/29/18 0838)   0.9% sodium chloride BOLUS (not administered)   piperacillin-tazobactam (ZOSYN) 3.375 g vial to attach to  mL bag (not administered)   0.9% sodium chloride BOLUS (1,000 mLs Intravenous New Bag 9/29/18 0837)   HYDROmorphone (PF) (DILAUDID) injection 0.5 mg (0.5 mg Intravenous Given 9/29/18 0844)   iopamidol (ISOVUE-370) solution 98 mL (98 mLs Intravenous Given 9/29/18 0918)   Saline flush (70 mLs Intravenous Given 9/29/18 0918)       Drips infusing?:   Yes    For the majority of the shift this patient was Green.   Interventions performed were   .    Severe Sepsis OR Septic Shock Diagnosis Present: No    To be done/followed up on inpatient unit:  na    ED NURSE PHONE NUMBER: *[phone]

## 2018-09-29 NOTE — DISCHARGE INSTRUCTIONS
[facility]  Discharge Instructions: Post-Operative Appendectomy    ACTIVITY    Increase your activity gradually.  Avoid strenuous physical activity or heavy lifting greater than 15-20 lbs. for 2-3 weeks.  You may climb stairs.    You may drive without restrictions when you are not using any prescription pain medication and comfortable in a car.    You may return to work/school when you are comfortable without any prescription pain medication.    WOUND CARE    You may remove your bandage and shower 48 hours after the surgery.  Pat your incisions dry and leave open to air.  Re-apply dressing (Band-aid or gauze/tape) as needed for drainage.    You may have steri-strips (looks like tape) or Dermabond (looks like glue) on your incision.  Leave it alone, it will peel up and fall off on its own.     Do not soak your incisions in a tub or pool for 2 weeks.     DIET    Return to the diet you were on before surgery.    Drink plenty of liquids to stay hydrated.    PAIN    Expect some tenderness and discomfort at the incision sites.  Use the prescribed pain medication at your discretion.  Expect gradual resolution of your pain over several days.    You may take ibuprofen with food (unless you have been told not to) instead of or in addition to your prescribed pain medication.  If you are taking Norco or Percocet, do not take any additional acetaminophen/APAP/Tylenol.    Do not drink alcohol or drive while you are taking pain medications.    You may apply ice to your incisions in 20 minute intervals as needed for the next 48 hours.  After that time, consider switching to heat if you prefer.    RETURN APPOINTMENT    Expect a follow-up call in 2-4 weeks.  If you have concerns or have not received a call by then, please call the office at [phone] to schedule an appointment. We are located at [address] [place] [address].    CALL OUR OFFICE IF YOU HAVE:     Chills  or fever above 101.5 F.    Increased redness or drainage at your incisions.    Significant bleeding.    Pain not relieved by your pain medication or rest.    Increasing pain after the first 48 hours.    Any other concerns or questions.    HELPFUL HINTS    Pain medications can cause constipation.  Limit use when possible.  Take over the counter stool softener/stimulant, such as Colace or Senna, with plenty of water.  You may take a mild over the counter laxative, such as Miralax or a suppository, as needed.     For laparoscopic surgery, you may have shoulder or upper back discomfort due to the gas used in surgery.  This is temporary and should resolve in 48-72 hours.  Short, frequent walks may help with this.  Revised August 2017

## 2018-09-29 NOTE — BRIEF OP NOTE
[facility] Brief Operative Note    Pre-operative diagnosis: Acute appendicitis   Post-operative diagnosis same   Procedure: Procedure(s):  LAPAROSCOPIC APPENDECTOMY - Wound Class: III-Contaminated   Surgeon(s): Surgeon(s) and Role:     * Kassy Thapa MD - Primary     * Yaya Eldridge MD - Assisting   Estimated blood loss: 5 ml   Specimens:   ID Type Source Tests Collected by Time Destination   A : appendix tissue Tissue Appendix SURGICAL PATHOLOGY EXAM Kassy Thapa MD 9/29/2018 12:18 PM       Findings: 1. Acutely inflamed appendix with peritoneal adhesions.  Not perforated.  2. Thickened gallbladder wall with a compressible gallbladder.

## 2018-09-30 PROCEDURE — 40000936 ZZH STATISTIC OUTPATIENT (NON-OBS) NIGHT

## 2018-09-30 NOTE — ANESTHESIA POSTPROCEDURE EVALUATION
Patient: Cherry Richards    Procedure(s):  LAPAROSCOPIC APPENDECTOMY - Wound Class: III-Contaminated    Diagnosis:Unknown   Diagnosis Additional Information: No value filed.    Anesthesia Type:  General, ETT    Note:  Anesthesia Post Evaluation    Patient location during evaluation: PACU  Patient participation: Able to fully participate in evaluation  Level of consciousness: awake and alert  Pain management: adequate  Airway patency: patent  Cardiovascular status: acceptable  Respiratory status: acceptable  Hydration status: acceptable  PONV: none and controlled     Anesthetic complications: None          Last vitals:  Vitals:    09/29/18 1920 09/29/18 2111 09/29/18 2330   BP: 125/68 130/86 116/74   Pulse:  72    Resp: 16 18 14   Temp: 37.2  C (99  F)  35.9  C (96.6  F)   SpO2: 93% 95% 97%         Electronically Signed By: Ancelmo Wells MD  September 30, 2018  7:47 AM

## 2018-09-30 NOTE — PROGRESS NOTES
Patient refused to keep Capno monitoring on. Provided education on Capno and reasons for placing Capno to monitor his respiration. Patient still refused to put Capno. Capno monitoring is taken off. Placed continuous pulse/ox. Will continue to monitor and assess.

## 2018-09-30 NOTE — PROGRESS NOTES
Patient son arrived, discharge instruction reviewed with patient and son, questions answered. Hard copy of prescription given to patient's son(Shashi).Patient was wheeled out and he left with son in good condition.

## 2018-09-30 NOTE — PLAN OF CARE
Problem: Patient Care Overview  Goal: Plan of Care/Patient Progress Review  Outcome: No Change  A/O, VSS ex htn at times, SBA, abd lap sites WDL CDI, 3/10 pain (declines pain meds), aspirin for headache with improvement, encourage fluid intake, IS 1500, tolerating clears, ambulated in room and finn but had emesis x1/nausea after, zofran and compazine given. Pt feeling lightheaded at times. Pt encouraged to rest tonight. Discharge pending.

## 2018-09-30 NOTE — PROGRESS NOTES
At change of shift, patient appears reece and not very happy and when asked , he indicated he will like to go home. Writer explained that because he was nauseous and not feeling so well when he attempted to ambulated the last time, it might be a good idea to be monitored overnight but it appears he really want to go home.On assessment, he denied any pain nor nausea, vss on room air. Ambulated to bathroom first with SBA and later independently.Charge nurse was notified who says if he feels o.k that he could go home.Pt called his son and the son will come for discharge instruction and take him home.

## 2018-10-02 LAB — COPATH REPORT: NORMAL

## 2018-10-03 ENCOUNTER — OFFICE VISIT (OUTPATIENT)
Dept: FAMILY MEDICINE | Facility: CLINIC | Age: 53
End: 2018-10-03
Payer: COMMERCIAL

## 2018-10-03 VITALS
DIASTOLIC BLOOD PRESSURE: 84 MMHG | SYSTOLIC BLOOD PRESSURE: 132 MMHG | WEIGHT: 190 LBS | OXYGEN SATURATION: 97 % | BODY MASS INDEX: 29.82 KG/M2 | HEIGHT: 67 IN | RESPIRATION RATE: 14 BRPM | HEART RATE: 62 BPM | TEMPERATURE: 97.6 F

## 2018-10-03 DIAGNOSIS — Z23 NEED FOR PROPHYLACTIC VACCINATION AND INOCULATION AGAINST INFLUENZA: ICD-10-CM

## 2018-10-03 DIAGNOSIS — K35.890 OTHER ACUTE APPENDICITIS: Primary | ICD-10-CM

## 2018-10-03 DIAGNOSIS — Z09 S/P APPENDECTOMY, FOLLOW-UP EXAM: ICD-10-CM

## 2018-10-03 DIAGNOSIS — K80.20 GALLSTONES: ICD-10-CM

## 2018-10-03 PROCEDURE — 99214 OFFICE O/P EST MOD 30 MIN: CPT | Performed by: FAMILY MEDICINE

## 2018-10-03 NOTE — LETTER
October 3, 2018      Cherry Richards  [address]  [place] [address]        To Whom It May Concern:    Cherry Richards was seen in our clinic today. He may return to work on 10/12/18 (Friday) if he is feeling better.  When he returns to work on 10/12/18, he will require the following restrictions:    No heavy lifting.  Nothing more than 10 lbs at a time in the first week back to work.        Sincerely,        Delphine Levi, DO

## 2018-10-03 NOTE — MR AVS SNAPSHOT
After Visit Summary   10/3/2018    Cherry Richards    MRN: [id number]           Patient Information     Date Of Birth          1965        Visit Information        Provider Department      10/3/2018 11:30 AM Delphine Levi DO [facility]        Today's Diagnoses     Other acute appendicitis    -  1    S/P appendectomy, follow-up exam        Gallstones        Need for prophylactic vaccination and inoculation against influenza          Care Instructions      Surgery for Appendicitis    Your side may hurt so much that you call your healthcare provider. Or maybe you go straight to the hospital emergency room. After your evaluation, your healthcare provider may decide that you have appendicitis. If so, you will need surgery. Your healthcare provider will send you to a hospital room or take you right to the operating room. There, your medical team will prepare you for surgery.  Your experience  You may receive fluids and antibiotics through an IV (intravenous) line. Tell your healthcare provider if you are allergic to any antibiotics or other medicines. Before surgery, an anesthesiologist or nurse anesthetist will also talk to you. He or she will give you general anesthesia just before your appendectomy. This keeps you pain-free and allows you to sleep during the surgery.  During surgery  The goal of surgery is to remove your appendix safely. In most cases, the surgery lasts from 30 minutes to an hour. If your appendix has burst or perforated releasing bacteria into the abdominal cavity, surgery may take longer. Your surgeon may use 1 of 2 techniques to reach your appendix. Your surgeon will discuss which is best for you:    Open surgery. Your surgeon makes 1 incision (several inches long) in your lower right side. He or she makes a bigger incision if your appendix has perforated.    Laparoscopic surgery. Your surgeon makes from 2 to 4 small incisions. One is near your  chrisston. The others are elsewhere on your stomach. Your surgeon inserts a laparoscope, a thin tube with a camera attached, through one of the incisions. The camera shows the inside of your belly on a monitor. This image helps guide the surgery. Your surgeon inserts surgical tools into the other incisions.  Sometimes if the appendix has burst and a pocket of infection has formed, this will be drained and antibiotics given for some time (perhaps weeks) before the appendix is removed.   Finishing the surgery  In most cases, the surgeon closes the entire incision with stitches or staples. Your surgeon may place a temporary drain in the wound or in your belly. This helps cure or prevent infection. If your appendix perforated, your surgeons may leave the outer layers of your incision open. Leaving the skin open prevents infection from forming under the skin. It may heal on its own, or be closed about 5 days later.  Recovery  Most patients recover quickly after an appendectomy. You will likely be in the hospital for 1 to 2 days. If your appendix perforated, you may stay longer. After you return home, plan on a follow-up visit to the healthcare provider in 1 to 2 weeks.  In the hospital  In most cases, you will drink liquids and walk on the day of your surgery. You will also receive pain medicine. To help keep your lungs clear, a healthcare provider may teach you how to do breathing exercises.  Back at home  To help control pain from surgery, take your medicines as directed. Avoid strenuous activity, heavy lifting, and driving until your surgeon says it is OK. As instructed, slowly resume your normal activities in 7 to 10 days.  Risks and complications  Risks and complications can include the following:    Infection or bleeding from the incision site    Infection or swelling in the belly, or leakage of bowel material    Delayed return of bowel or intestinal function (bowel ileus) or bowel blockage    Problems from  anesthesia   Call your healthcare provider  Call your healthcare provider if you notice any of the following:    Swelling, oozing, worsening pain, or unusual redness around the incision    Fever of 100.4 F (38 C) or higher, or as directed by your healthcare provider       Worsening belly pain    Severe diarrhea, bloating, or constipation    Nausea or vomiting   Date Last Reviewed: 10/1/2016    8350-6708 [employer]. [address] [place] [address]. All rights reserved. This information is not intended as a substitute for professional medical care. Always follow your healthcare professional's instructions.        Cholecystitis (Presumed)    Your abdominal pain may be due to an inflammation and possible infection in the gallbladder. This is called cholecystitis. The gallbladder is a small sac under the liver, which stores and releases bile. Bile is a fluid made in the liver that aids in the digestion of fat. Eating fatty food stimulates the gallbladder to contract, and release the bile. Gallstones may form in this sac (called cholelithiasis). Although most people do not have symptoms, if the stone moves and blocks the passage of bile out of the bladder, it can cause pain and even an infection. The infection is called cholecystitis. Bile sludge without a stone can also cause cholecystitis.  To be more certain of the diagnosis, you may need to have an ultrasound, CT scan or other special test.  A number of things increase the risk of developing gallstones:    Women are more likely to have the problem    Obesity    Increasing age    Losing or gaining weight quickly    High calorie diet    Pregnancy    Hormone therapy    Diabetes  The most common symptoms are:    Abdominal pain, cramping, aching    Nausea, vomiting    Fever  Many illnesses can cause these symptoms. This pain usually starts in the upper right side of your abdomen. Sometimes it can radiate to your right shoulder, back and arm. It  usually starts suddenly, becomes more intense quickly, and then gradually decreases and disappears over a couple of hours. Elderly people and diabetics may have difficulty showing where the pain is exactly. The pain may occur after meals, particularly fatty meals.  Home care    Rest in bed and follow a clear liquid diet until the pain, nausea and vomiting go away.    Antibiotics and other medicine may be prescribed. Take these exactly as directed.    You can take acetaminophen or ibuprofen for pain, unless you were given a different pain medicine to use. Note: If you have chronic liver or kidney disease or ever had a stomach ulcer or GI bleeding or are taking blood thinner medicines, talk with your healthcare provider before using these medicines.    Fat in your diet makes the gallbladder contract and may cause increased pain. Therefore, avoid fat in your diet over the next two days and follow a low-fat diet thereafter. If you are overweight, a low-fat diet will help you lose weight.  Follow-up care  An infection in the gallbladder is a serious problem and must be watched carefully. Keep any appointments made for you to have further testing. See your healthcare provider for another exam in the next 1-2 days, or as directed by our staff. Once cholecystitis has occurred, removal of the gallbladder is usually required to prevent a recurrence. You can discuss this at your follow-up visit. If you were hospitalized for cholecystitis, your gallbladder may be removed during that same hospital stay. Gallstones that are not causing infection or symptoms often do not need surgery.  When to seek medical advice  Call your healthcare provider if any of the following occur:    Repeated vomiting    Swelling of the abdomen    Pain lasting over 6 hours    Fever of 100.4 F (38 C) or higher, or as directed by your healthcare provider    Weakness, dizziness, or fainting    Dark urine or light colored stools    Yellow color of the skin  "or eyes    Chest, arm, back, neck, or jaw pain  Date Last Reviewed: 2017-2017 [employer]. [address] [place] [address], [place] [address]. All rights reserved. This information is not intended as a substitute for professional medical care. Always follow your healthcare professional's instructions.                Follow-ups after your visit        Follow-up notes from your care team     Return in about 2 weeks (around 10/17/2018) for If needed.      Who to contact     If you have questions or need follow up information about today's clinic visit or your schedule please contact [facility] directly at [phone].  Normal or non-critical lab and imaging results will be communicated to you by MyChart, letter or phone within 4 business days after the clinic has received the results. If you do not hear from us within 7 days, please contact the clinic through MyChart or phone. If you have a critical or abnormal lab result, we will notify you by phone as soon as possible.  Submit refill requests through [employer] or call your pharmacy and they will forward the refill request to us. Please allow 3 business days for your refill to be completed.          Additional Information About Your Visit        MyChart Information     [employer] lets you send messages to your doctor, view your test results, renew your prescriptions, schedule appointments and more. To sign up, go to [email or web address] . Click on \"Log in\" on the left side of the screen, which will take you to the Welcome page. Then click on \"Sign up Now\" on the right side of the page.     You will be asked to enter the access code listed below, as well as some personal information. Please follow the directions to create your username and password.     Your access code is: [email or web address]  Expires: 2018  3:26 PM     Your access code will  in 90 days. If you need help or a new code, please call your [facility] " "or [phone].        Care EveryWhere ID     This is your Care EveryWhere ID. This could be used by other organizations to access your [place] medical records  [id number]        Your Vitals Were     Pulse Temperature Respirations Height Pulse Oximetry BMI (Body Mass Index)    62 97.6  F (36.4  C) (Tympanic) 14 5' 7\" (1.702 m) 97% 29.76 kg/m2       Blood Pressure from Last 3 Encounters:   10/03/18 132/84   09/29/18 116/74   12/15/15 124/72    Weight from Last 3 Encounters:   10/03/18 190 lb (86.2 kg)   09/29/18 194 lb (88 kg)   12/15/15 198 lb (89.8 kg)              Today, you had the following     No orders found for display       Primary Care Provider Office Phone # Fax #    Yadira Alexandria [place] [phone]       [address] [facility] [address]        Equal Access to Services     [facility]: Hadii aad ku hadasho Soomaali, waaxda luqadaha, qaybta kaalmada adeegyada, waxay idiin megan greenearaann-marie henry . So [facility] [phone].    ATENCIÓN: Si habla español, tiene a beal disposición servicios gratuitos de asistencia lingüística. Llame al [phone].    We comply with applicable federal civil rights laws and [place] laws. We do not discriminate on the basis of race, color, national origin, age, disability, sex, sexual orientation, or gender identity.            Thank you!     Thank you for choosing [facility]  for your care. Our goal is always to provide you with excellent care. Hearing back from our patients is one way we can continue to improve our services. Please take a few minutes to complete the written survey that you may receive in the mail after your visit with us. Thank you!             Your Updated Medication List - Protect others around you: Learn how to safely use, store and throw away your medicines at www.disposemymeds.org.          This list is accurate as of 10/3/18 12:18 PM.  Always use your most recent med list.             "       Brand Name Dispense Instructions for use Diagnosis    ASPIRIN PO      Take 325 mg by mouth daily as needed        oxyCODONE-acetaminophen 5-325 MG per tablet    PERCOCET    20 tablet    Take 1-2 tablets by mouth every 4 hours as needed for pain (moderate to severe)    Acute post-operative pain       senna-docusate 8.6-50 MG per tablet    SENOKOT-S;PERICOLACE    30 tablet    Take 1-2 tablets by mouth 2 times daily Take while on oral narcotics to prevent or treat constipation.    Acute appendicitis, unspecified acute appendicitis type

## 2018-10-03 NOTE — PROGRESS NOTES
SUBJECTIVE:   Cherry Richards is a 53 year old male who presents to clinic today for the following health issues:          Hospital Follow-up Visit:    Hospital/Nursing Home/IP Rehab Facility: [facility]  Date of Admission: 09/28/2018  Date of Discharge: 09/28/2018  Reason(s) for Admission: Abdominal pain---appendectomy  Needs note for work and time off needed.            Problems taking medications regularly:  None       Medication changes since discharge: None       Problems adhering to non-medication therapy:  None    Summary of hospitalization:  [facility] discharge summary reviewed  Diagnostic Tests/Treatments reviewed.  Follow up needed: with PCP  Other Healthcare Providers Involved in Patient s Care:         Surgical follow-up appointment - in 2 weeks if needed  Update since discharge: improved.     Post Discharge Medication Reconciliation: discharge medications reconciled and changed, per note/orders (see AVS).  Plan of care communicated with patient and wife     Coding guidelines for this visit:  Type of Medical   Decision Making Face-to-Face Visit       within 7 Days of discharge Face-to-Face Visit        within 14 days of discharge   Moderate Complexity [id number]   High Complexity [id number]            Cherry Richards is a 53 year old that presented to the ED on 9/29/18 with 1 day history of worsening abdominal pain.   An abdominal CT was performed which showed acute appendicitis as well as concern for possibly thickened gallbladder wall with stones.   Pt sent to surgery and got emergency appendectomy (laproscopic) on 9/29/18. Appendix was not perforated.  Gallbladder was left in place since it did not look acutely inflamed during the surgery.   Still having pain in stomach near bruising.  Was constipated, but now doing better.   No fever. No discharge.    Thinks he was recommended to f/u in 2 weeks with surgery if needed.      Problem list and histories reviewed & adjusted, as  "indicated.  Additional history: as documented    Labs reviewed in EPIC    Reviewed and updated as needed this visit by clinical staff  Tobacco  Allergies  Meds  Problems  Med Hx  Surg Hx  Fam Hx  Soc Hx        Reviewed and updated as needed this visit by Provider  Allergies  Meds  Problems         ROS:  CONSTITUTIONAL: NEGATIVE for fever, chills, change in weight  INTEGUMENTARY/SKIN: POSITIVE for bruises on abdomen.  EYES: NEGATIVE for vision changes or irritation  ENT/MOUTH: NEGATIVE for ear, mouth and throat problems  RESP: NEGATIVE for significant cough or SOB  CV: NEGATIVE for chest pain, palpitations or peripheral edema  GI: POSITIVE for robyn-umbilical abdominal pain  : NEGATIVE for frequency, dysuria, or hematuria  MUSCULOSKELETAL: NEGATIVE for significant arthralgias or myalgia  NEURO: NEGATIVE for weakness, dizziness or paresthesias  HEME: NEGATIVE for bleeding problems    OBJECTIVE:     /84 (BP Location: Left arm, Patient Position: Sitting, Cuff Size: Adult Regular)  Pulse 62  Temp 97.6  F (36.4  C) (Tympanic)  Resp 14  Ht 5' 7\" (1.702 m)  Wt 190 lb (86.2 kg)  SpO2 97%  BMI 29.76 kg/m2  Body mass index is 29.76 kg/(m^2).   GENERAL: Alert and no acute distress  EYES: Eyes grossly normal to inspection, PERRL and conjunctivae and sclerae normal  HENT: Nose and mouth without ulcers or lesions  NECK: no adenopathy, no asymmetry, masses, or scars and thyroid normal to palpation  RESP: lungs clear to auscultation - no rales, rhonchi or wheezes  CV: regular rate and rhythm, normal S1 S2, no S3 or S4, no murmur, click or rub, no peripheral edema and peripheral pulses strong  ABDOMEN: +tenderness over umbilical area where bruising is present, bowel sounds normal  MS: no gross musculoskeletal defects noted, no edema  SKIN: post-operative laparoscopic changes on abdomen noted; well-healing ecchymosis (yellow-purple) over umbilicus with tenderness.  No discharge or erythema noted.  +tenderness " with moderate palpation over umbilicus where bruising is present.    NEURO: Normal strength and tone, mentation intact and speech normal  PSYCH: mentation appears normal, affect normal/bright    Diagnostic Test Results:  none     ASSESSMENT/PLAN:     Problem List Items Addressed This Visit     Acute appendicitis - Primary    S/P appendectomy, follow-up exam    Gallstones      Other Visit Diagnoses     Need for prophylactic vaccination and inoculation against influenza             Surgery and ED visit reviewed and discussed.   Post-operative pain discussed as well.  Recommended to switch to Tylenol PRN pain instead to avoid any extra thinning of the blood (NSAIDs, Advil, Ibuprofen) while he is recovering.  But may continue his daily baby aspirin.    No need for opioids.   Will also rest the affected painful area as much as possible.  Apply ice for 15-20 minutes intermittently as needed and especially after any offending activity.  As pain recedes, begin normal activities slowly as tolerated.    Needs to have letter that he can return to work but with temporary restrictions--See letter for details.   Faxed letter to his work as requested.  A copy of the letter was given to the patient too.  Care/management of appendectomy and gallstones discussed; handout provided as well.  Will RTC in 2-3 weeks if needed and monitor for any red flag signs (such as increased/worsening abdominal pain, bleeding, discharge, redness, fever, etc).      Delphine Levi, DO  [facility]

## 2018-10-03 NOTE — LETTER
October 3, 2018      Cherry Richards  [address]  [place] [address]        To Whom It May Concern:    October 3, 2018      Cherry Richards  [address]  [place] [address]        To Whom It May Concern:    Cherry Richards was seen in our clinic today for a follow-up on an appendectomy he had at the hospital on 9/29/18 for acute appendicitis. He may return to work on 10/12/18 (Friday) if he is feeling better.  When he returns to work on 10/12/18, he will require the following restrictions:    No heavy lifting.  Nothing more than 10 lbs at a time in the first week back to work.        Sincerely,        Delphine Levi, DO

## 2018-10-03 NOTE — PATIENT INSTRUCTIONS
Surgery for Appendicitis    Your side may hurt so much that you call your healthcare provider. Or maybe you go straight to the hospital emergency room. After your evaluation, your healthcare provider may decide that you have appendicitis. If so, you will need surgery. Your healthcare provider will send you to a hospital room or take you right to the operating room. There, your medical team will prepare you for surgery.  Your experience  You may receive fluids and antibiotics through an IV (intravenous) line. Tell your healthcare provider if you are allergic to any antibiotics or other medicines. Before surgery, an anesthesiologist or nurse anesthetist will also talk to you. He or she will give you general anesthesia just before your appendectomy. This keeps you pain-free and allows you to sleep during the surgery.  During surgery  The goal of surgery is to remove your appendix safely. In most cases, the surgery lasts from 30 minutes to an hour. If your appendix has burst or perforated releasing bacteria into the abdominal cavity, surgery may take longer. Your surgeon may use 1 of 2 techniques to reach your appendix. Your surgeon will discuss which is best for you:    Open surgery. Your surgeon makes 1 incision (several inches long) in your lower right side. He or she makes a bigger incision if your appendix has perforated.    Laparoscopic surgery. Your surgeon makes from 2 to 4 small incisions. One is near your bellybutton. The others are elsewhere on your stomach. Your surgeon inserts a laparoscope, a thin tube with a camera attached, through one of the incisions. The camera shows the inside of your belly on a monitor. This image helps guide the surgery. Your surgeon inserts surgical tools into the other incisions.  Sometimes if the appendix has burst and a pocket of infection has formed, this will be drained and antibiotics given for some time (perhaps weeks) before the appendix is removed.   Finishing the  surgery  In most cases, the surgeon closes the entire incision with stitches or staples. Your surgeon may place a temporary drain in the wound or in your belly. This helps cure or prevent infection. If your appendix perforated, your surgeons may leave the outer layers of your incision open. Leaving the skin open prevents infection from forming under the skin. It may heal on its own, or be closed about 5 days later.  Recovery  Most patients recover quickly after an appendectomy. You will likely be in the hospital for 1 to 2 days. If your appendix perforated, you may stay longer. After you return home, plan on a follow-up visit to the healthcare provider in 1 to 2 weeks.  In the hospital  In most cases, you will drink liquids and walk on the day of your surgery. You will also receive pain medicine. To help keep your lungs clear, a healthcare provider may teach you how to do breathing exercises.  Back at home  To help control pain from surgery, take your medicines as directed. Avoid strenuous activity, heavy lifting, and driving until your surgeon says it is OK. As instructed, slowly resume your normal activities in 7 to 10 days.  Risks and complications  Risks and complications can include the following:    Infection or bleeding from the incision site    Infection or swelling in the belly, or leakage of bowel material    Delayed return of bowel or intestinal function (bowel ileus) or bowel blockage    Problems from anesthesia   Call your healthcare provider  Call your healthcare provider if you notice any of the following:    Swelling, oozing, worsening pain, or unusual redness around the incision    Fever of 100.4 F (38 C) or higher, or as directed by your healthcare provider       Worsening belly pain    Severe diarrhea, bloating, or constipation    Nausea or vomiting   Date Last Reviewed: 10/1/2016    2236-9859 [employer]. [address] [place] [address]. All rights reserved. This  information is not intended as a substitute for professional medical care. Always follow your healthcare professional's instructions.        Cholecystitis (Presumed)    Your abdominal pain may be due to an inflammation and possible infection in the gallbladder. This is called cholecystitis. The gallbladder is a small sac under the liver, which stores and releases bile. Bile is a fluid made in the liver that aids in the digestion of fat. Eating fatty food stimulates the gallbladder to contract, and release the bile. Gallstones may form in this sac (called cholelithiasis). Although most people do not have symptoms, if the stone moves and blocks the passage of bile out of the bladder, it can cause pain and even an infection. The infection is called cholecystitis. Bile sludge without a stone can also cause cholecystitis.  To be more certain of the diagnosis, you may need to have an ultrasound, CT scan or other special test.  A number of things increase the risk of developing gallstones:    Women are more likely to have the problem    Obesity    Increasing age    Losing or gaining weight quickly    High calorie diet    Pregnancy    Hormone therapy    Diabetes  The most common symptoms are:    Abdominal pain, cramping, aching    Nausea, vomiting    Fever  Many illnesses can cause these symptoms. This pain usually starts in the upper right side of your abdomen. Sometimes it can radiate to your right shoulder, back and arm. It usually starts suddenly, becomes more intense quickly, and then gradually decreases and disappears over a couple of hours. Elderly people and diabetics may have difficulty showing where the pain is exactly. The pain may occur after meals, particularly fatty meals.  Home care    Rest in bed and follow a clear liquid diet until the pain, nausea and vomiting go away.    Antibiotics and other medicine may be prescribed. Take these exactly as directed.    You can take acetaminophen or ibuprofen for pain,  unless you were given a different pain medicine to use. Note: If you have chronic liver or kidney disease or ever had a stomach ulcer or GI bleeding or are taking blood thinner medicines, talk with your healthcare provider before using these medicines.    Fat in your diet makes the gallbladder contract and may cause increased pain. Therefore, avoid fat in your diet over the next two days and follow a low-fat diet thereafter. If you are overweight, a low-fat diet will help you lose weight.  Follow-up care  An infection in the gallbladder is a serious problem and must be watched carefully. Keep any appointments made for you to have further testing. See your healthcare provider for another exam in the next 1-2 days, or as directed by our staff. Once cholecystitis has occurred, removal of the gallbladder is usually required to prevent a recurrence. You can discuss this at your follow-up visit. If you were hospitalized for cholecystitis, your gallbladder may be removed during that same hospital stay. Gallstones that are not causing infection or symptoms often do not need surgery.  When to seek medical advice  Call your healthcare provider if any of the following occur:    Repeated vomiting    Swelling of the abdomen    Pain lasting over 6 hours    Fever of 100.4 F (38 C) or higher, or as directed by your healthcare provider    Weakness, dizziness, or fainting    Dark urine or light colored stools    Yellow color of the skin or eyes    Chest, arm, back, neck, or jaw pain  Date Last Reviewed: 9/1/2017 2000-2017 [employer]. [address] [place] [address], [place] [address]. All rights reserved. This information is not intended as a substitute for professional medical care. Always follow your healthcare professional's instructions.

## 2018-10-05 LAB
BACTERIA SPEC CULT: NO GROWTH
BACTERIA SPEC CULT: NO GROWTH
Lab: NORMAL
Lab: NORMAL
SPECIMEN SOURCE: NORMAL
SPECIMEN SOURCE: NORMAL

## 2018-10-06 ENCOUNTER — APPOINTMENT (OUTPATIENT)
Dept: ULTRASOUND IMAGING | Facility: CLINIC | Age: 53
End: 2018-10-06
Attending: PHYSICIAN ASSISTANT
Payer: COMMERCIAL

## 2018-10-06 ENCOUNTER — HOSPITAL ENCOUNTER (EMERGENCY)
Facility: CLINIC | Age: 53
Discharge: HOME | End: 2018-10-06
Attending: EMERGENCY MEDICINE | Admitting: EMERGENCY MEDICINE
Payer: COMMERCIAL

## 2018-10-06 VITALS
RESPIRATION RATE: 20 BRPM | OXYGEN SATURATION: 97 % | TEMPERATURE: 98 F | BODY MASS INDEX: 29.82 KG/M2 | SYSTOLIC BLOOD PRESSURE: 123 MMHG | DIASTOLIC BLOOD PRESSURE: 69 MMHG | WEIGHT: 190 LBS | HEIGHT: 67 IN

## 2018-10-06 DIAGNOSIS — K80.20 CHOLELITHIASIS WITHOUT CHOLECYSTITIS: ICD-10-CM

## 2018-10-06 LAB
ALBUMIN SERPL-MCNC: 4 G/DL (ref 3.4–5)
ALP SERPL-CCNC: 86 U/L (ref 40–150)
ALT SERPL W P-5'-P-CCNC: 22 U/L (ref 0–70)
ANION GAP SERPL CALCULATED.3IONS-SCNC: 6 MMOL/L (ref 3–14)
AST SERPL W P-5'-P-CCNC: 12 U/L (ref 0–45)
BASOPHILS # BLD AUTO: 0 10E9/L (ref 0–0.2)
BASOPHILS NFR BLD AUTO: 0.2 %
BILIRUB SERPL-MCNC: 0.5 MG/DL (ref 0.2–1.3)
BUN SERPL-MCNC: 20 MG/DL (ref 7–30)
CALCIUM SERPL-MCNC: 8.9 MG/DL (ref 8.5–10.1)
CHLORIDE SERPL-SCNC: 103 MMOL/L (ref 94–109)
CO2 SERPL-SCNC: 31 MMOL/L (ref 20–32)
CREAT SERPL-MCNC: 1.18 MG/DL (ref 0.66–1.25)
DIFFERENTIAL METHOD BLD: ABNORMAL
EOSINOPHIL # BLD AUTO: 0.1 10E9/L (ref 0–0.7)
EOSINOPHIL NFR BLD AUTO: 0.5 %
ERYTHROCYTE [DISTWIDTH] IN BLOOD BY AUTOMATED COUNT: 13.1 % (ref 10–15)
GFR SERPL CREATININE-BSD FRML MDRD: 64 ML/MIN/1.7M2
GLUCOSE SERPL-MCNC: 145 MG/DL (ref 70–99)
HCT VFR BLD AUTO: 47.8 % (ref 40–53)
HGB BLD-MCNC: 17 G/DL (ref 13.3–17.7)
IMM GRANULOCYTES # BLD: 0.1 10E9/L (ref 0–0.4)
IMM GRANULOCYTES NFR BLD: 0.8 %
INTERPRETATION ECG - MUSE: NORMAL
LIPASE SERPL-CCNC: 202 U/L (ref 73–393)
LYMPHOCYTES # BLD AUTO: 1 10E9/L (ref 0.8–5.3)
LYMPHOCYTES NFR BLD AUTO: 7.5 %
MCH RBC QN AUTO: 30.5 PG (ref 26.5–33)
MCHC RBC AUTO-ENTMCNC: 35.6 G/DL (ref 31.5–36.5)
MCV RBC AUTO: 86 FL (ref 78–100)
MONOCYTES # BLD AUTO: 0.5 10E9/L (ref 0–1.3)
MONOCYTES NFR BLD AUTO: 3.6 %
NEUTROPHILS # BLD AUTO: 12 10E9/L (ref 1.6–8.3)
NEUTROPHILS NFR BLD AUTO: 87.4 %
NRBC # BLD AUTO: 0 10*3/UL
NRBC BLD AUTO-RTO: 0 /100
PLATELET # BLD AUTO: 215 10E9/L (ref 150–450)
POTASSIUM SERPL-SCNC: 3.8 MMOL/L (ref 3.4–5.3)
PROT SERPL-MCNC: 8.3 G/DL (ref 6.8–8.8)
RBC # BLD AUTO: 5.58 10E12/L (ref 4.4–5.9)
SODIUM SERPL-SCNC: 140 MMOL/L (ref 133–144)
WBC # BLD AUTO: 13.8 10E9/L (ref 4–11)

## 2018-10-06 PROCEDURE — 96361 HYDRATE IV INFUSION ADD-ON: CPT

## 2018-10-06 PROCEDURE — 96374 THER/PROPH/DIAG INJ IV PUSH: CPT

## 2018-10-06 PROCEDURE — 99285 EMERGENCY DEPT VISIT HI MDM: CPT | Mod: 25

## 2018-10-06 PROCEDURE — 96375 TX/PRO/DX INJ NEW DRUG ADDON: CPT

## 2018-10-06 PROCEDURE — 25000128 H RX IP 250 OP 636: Performed by: PHYSICIAN ASSISTANT

## 2018-10-06 PROCEDURE — 93005 ELECTROCARDIOGRAM TRACING: CPT

## 2018-10-06 PROCEDURE — 80053 COMPREHEN METABOLIC PANEL: CPT | Performed by: PHYSICIAN ASSISTANT

## 2018-10-06 PROCEDURE — 83690 ASSAY OF LIPASE: CPT | Performed by: PHYSICIAN ASSISTANT

## 2018-10-06 PROCEDURE — 85025 COMPLETE CBC W/AUTO DIFF WBC: CPT | Performed by: PHYSICIAN ASSISTANT

## 2018-10-06 PROCEDURE — 76705 ECHO EXAM OF ABDOMEN: CPT

## 2018-10-06 RX ORDER — ONDANSETRON 4 MG/1
4 TABLET, FILM COATED ORAL EVERY 8 HOURS PRN
Qty: 6 TABLET | Refills: 0 | Status: SHIPPED | OUTPATIENT
Start: 2018-10-06 | End: 2019-07-25

## 2018-10-06 RX ORDER — ONDANSETRON 2 MG/ML
4 INJECTION INTRAMUSCULAR; INTRAVENOUS EVERY 30 MIN PRN
Status: DISCONTINUED | OUTPATIENT
Start: 2018-10-06 | End: 2018-10-06 | Stop reason: HOSPADM

## 2018-10-06 RX ORDER — SODIUM CHLORIDE 9 MG/ML
1000 INJECTION, SOLUTION INTRAVENOUS CONTINUOUS
Status: DISCONTINUED | OUTPATIENT
Start: 2018-10-06 | End: 2018-10-06 | Stop reason: HOSPADM

## 2018-10-06 RX ORDER — HYDROMORPHONE HYDROCHLORIDE 1 MG/ML
0.5 INJECTION, SOLUTION INTRAMUSCULAR; INTRAVENOUS; SUBCUTANEOUS
Status: DISCONTINUED | OUTPATIENT
Start: 2018-10-06 | End: 2018-10-06 | Stop reason: HOSPADM

## 2018-10-06 RX ADMIN — SODIUM CHLORIDE 1000 ML: 9 INJECTION, SOLUTION INTRAVENOUS at 04:48

## 2018-10-06 RX ADMIN — Medication 0.5 MG: at 04:08

## 2018-10-06 RX ADMIN — SODIUM CHLORIDE 1000 ML: 9 INJECTION, SOLUTION INTRAVENOUS at 04:10

## 2018-10-06 RX ADMIN — ONDANSETRON 4 MG: 2 INJECTION INTRAMUSCULAR; INTRAVENOUS at 04:08

## 2018-10-06 ASSESSMENT — ENCOUNTER SYMPTOMS
NAUSEA: 1
FEVER: 0
DIARRHEA: 0
BLOOD IN STOOL: 0
VOMITING: 1
CONSTIPATION: 0
ABDOMINAL PAIN: 1

## 2018-10-06 NOTE — ED AVS SNAPSHOT
Emergency Department    [address] [facility] [address]    Phone:  [phone]    Fax:  [phone]                                       Cherry Richards   MRN: [id number]    Department:   Emergency Department   Date of Visit:  10/6/2018           Patient Information     Date Of Birth          1965        Your diagnoses for this visit were:     Cholelithiasis without cholecystitis        You were seen by Trierweiler, Chad A, MD.      Follow-up Information     Follow up with Yadira Moulton MD In 3 days.    Specialty:  Family Practice    Contact information:    [address]  [facility] [id number] [phone]          Follow up with  Emergency Department.    Specialty:  EMERGENCY MEDICINE    Why:  If symptoms worsen    Contact information:    [address] [facility] [address] [phone]        Follow up with Kassy Thapa MD. Call in 2 days.    Specialty:  Surgery    Contact information:    [address] [facility] [address]  [facility] [address] [phone]          Discharge Instructions         Gallstones with Biliary Colic    You have abdominal pain due to irritation and spasm of the gallbladder. This is called biliary colic. The gallbladder is a small sac under the liver, which stores and releases a fluid that aids in the digestion of fat. A collection of crystals may form stones inside the gallbladder (gallstones). Gallstones can cause the gallbladder to spasm. If they block the duct out of the gallbladder, they can cause pain and even an infection.   A number of factors increase the risk for having gallstones:    Being female    Being severely overweight (obese)    Older age    Losing or gaining weight quickly    Eating a high-calorie diet    Being pregnant    Taking hormone therapy    Having diabetes  Home care    Rest in bed.    Drink only clear liquids until you feel better.    You may have been prescribed medicine for pain or nausea. Take these  as directed.    Fat in your diet makes the gallbladder contract and may cause increased pain. Avoid foods that are high in fat (such as full-fat dairy, fried foods, and fatty meats) for at least two days.  All of the following are healthy foods for your gallbladder, as well as the rest of your body:  Fresh fruits and vegetables   Whole grains (whole-wheat bread, brown rice, oats, bran cereal)   Lean meat, poultry, and fish   Low-fat dairy products    If you are overweight, talk to your healthcare provider about losing weight.  Follow-up care  Follow up with your healthcare provider or as advised. There is a chance that you will have another episode of pain from your gallstones at some point. Removal of the gallbladder is an option to prevent this. Talk with your healthcare provider about your treatment options.  When to seek medical advice  Call your healthcare provider if any of the following occur:    Worsening pain or pain lasting for longer than 6 hours    Pain moving to the right lower abdomen    Repeated vomiting    Swollen abdomen    Fever of 100.4 F (38 C) or higher, or as directed by your healthcare provider    Very dark urine, light colored stools, or yellow color of the skin or eyes    Chest, arm, back, neck or jaw pain  Date Last Reviewed: 6/18/2015 2000-2017 [employer]. [address] [place] [address], [place] [address]. All rights reserved. This information is not intended as a substitute for professional medical care. Always follow your healthcare professional's instructions.          Discharge Instructions for Gallstones  Gallstones form when liquid stored in the gallbladder hardens into pieces of stone-like material. Stones in the gallbladder may or may not cause symptoms. They can cause pain or infection. You and your healthcare provider will decide on the best treatment for you. Here's what you can do.  Home care  These home care steps can help you after being diagnosed with  gallstones:    Eat a low-fat diet.  ? Read food labels to be sure the foods you are choosing are low in fat.  ? Limit the use of high-fat meats, dairy products, animal fats, and vegetable oils.    Keep all appointments with your healthcare provider. Your healthcare provider needs to monitor your condition.    Discuss your treatment choices with your healthcare provider, including:  ? Surgery to remove the gallbladder and gallstones  ? Medicine to dissolve the stones. This is mainly for people who cannot have surgery. Take your medicines exactly as directed. Don't skip doses. Remember, it takes time for the medicine to take effect. Unfortunately, once the medicine is stopped, the gallstones usually come back.   ? ERCP (endoscopic retrograde cholangiopancreatography). A healthcare provider uses a thin tube with video and X-rays to locate stones and remove them from the common bile duct.  Follow-up care  Make a follow-up appointment as directed by our staff.     When to call your healthcare provider  Call your healthcare provider right away if you have any of the following:    Severe pain in the upper belly, shoulder, or back    Fever of 100.4 F (38 C) or higher, or as directed by your healthcare provider    Nausea or vomiting    Yellowing of your skin or eyes (jaundice)   Date Last Reviewed: 7/1/2016 2000-2017 [employer]. [address] [place] [address]. All rights reserved. This information is not intended as a substitute for professional medical care. Always follow your healthcare professional's instructions.          24 Hour Appointment Hotline       To make an appointment at any [facility], call [phone] ([phone]). If you don't have a family doctor or clinic, we will help you find one. [facility] are conveniently located to serve the needs of you and your family.             Review of your medicines      START taking        Dose / Directions Last dose taken     ondansetron 4 MG tablet   Commonly known as:  ZOFRAN   Dose:  4 mg   Quantity:  6 tablet        Take 1 tablet (4 mg) by mouth every 8 hours as needed for nausea   Refills:  0          Our records show that you are taking the medicines listed below. If these are incorrect, please call your family doctor or clinic.        Dose / Directions Last dose taken    ASPIRIN PO   Dose:  325 mg        Take 325 mg by mouth daily as needed   Refills:  0                Prescriptions were sent or printed at these locations (1 Prescription)                   Other Prescriptions                Printed at Department/Unit printer (1 of 1)         ondansetron (ZOFRAN) 4 MG tablet                Procedures and tests performed during your visit     Abdomen US, limited (RUQ only)    CBC with platelets differential    Comprehensive metabolic panel    EKG 12-lead, tracing only    Lipase    Peripheral IV catheter      Orders Needing Specimen Collection     None      Pending Results     No orders found from 10/4/2018 to 10/7/2018.            Pending Culture Results     No orders found from 10/4/2018 to 10/7/2018.            Pending Results Instructions     If you had any lab results that were not finalized at the time of your Discharge, you can call the ED Lab Result RN at [phone]. You will be contacted by this team for any positive Lab results or changes in treatment. The nurses are available 7 days a week from 10A to 6:30P.  You can leave a message 24 hours per day and they will return your call.        Test Results From Your Hospital Stay        10/6/2018  4:12 AM      Component Results     Component Value Ref Range & Units Status    WBC 13.8 (H) 4.0 - 11.0 10e9/L Final    RBC Count 5.58 4.4 - 5.9 10e12/L Final    Hemoglobin 17.0 13.3 - 17.7 g/dL Final    Hematocrit 47.8 40.0 - 53.0 % Final    MCV 86 78 - 100 fl Final    MCH 30.5 26.5 - 33.0 pg Final    MCHC 35.6 31.5 - 36.5 g/dL Final    RDW 13.1 10.0 - 15.0 % Final    Platelet Count  215 150 - 450 10e9/L Final    Diff Method Automated Method  Final    % Neutrophils 87.4 % Final    % Lymphocytes 7.5 % Final    % Monocytes 3.6 % Final    % Eosinophils 0.5 % Final    % Basophils 0.2 % Final    % Immature Granulocytes 0.8 % Final    Nucleated RBCs 0 0 /100 Final    Absolute Neutrophil 12.0 (H) 1.6 - 8.3 10e9/L Final    Absolute Lymphocytes 1.0 0.8 - 5.3 10e9/L Final    Absolute Monocytes 0.5 0.0 - 1.3 10e9/L Final    Absolute Eosinophils 0.1 0.0 - 0.7 10e9/L Final    Absolute Basophils 0.0 0.0 - 0.2 10e9/L Final    Abs Immature Granulocytes 0.1 0 - 0.4 10e9/L Final    Absolute Nucleated RBC 0.0  Final         10/6/2018  4:30 AM      Component Results     Component Value Ref Range & Units Status    Sodium 140 133 - 144 mmol/L Final    Potassium 3.8 3.4 - 5.3 mmol/L Final    Chloride 103 94 - 109 mmol/L Final    Carbon Dioxide 31 20 - 32 mmol/L Final    Anion Gap 6 3 - 14 mmol/L Final    Glucose 145 (H) 70 - 99 mg/dL Final    Urea Nitrogen 20 7 - 30 mg/dL Final    Creatinine 1.18 0.66 - 1.25 mg/dL Final    GFR Estimate 64 >60 mL/min/1.7m2 Final    Non  GFR Calc    GFR Estimate If Black 77 >60 mL/min/1.7m2 Final    African American GFR Calc    Calcium 8.9 8.5 - 10.1 mg/dL Final    Bilirubin Total 0.5 0.2 - 1.3 mg/dL Final    Albumin 4.0 3.4 - 5.0 g/dL Final    Protein Total 8.3 6.8 - 8.8 g/dL Final    Alkaline Phosphatase 86 40 - 150 U/L Final    ALT 22 0 - 70 U/L Final    AST 12 0 - 45 U/L Final         10/6/2018  4:28 AM      Component Results     Component Value Ref Range & Units Status    Lipase 202 73 - 393 U/L Final         10/6/2018  5:08 AM      Narrative     US ABDOMEN LIMITED  10/6/2018 4:36 AM    CLINICAL INFORMATION: Right upper quadrant pain and tenderness, same.    COMPARISON: CT abdomen 9/29/2018.    FINDINGS: Limited right upper quadrant ultrasound demonstrates  multiple gallstones measuring up to 1.6 cm. No gallbladder wall  thickening or sonographic Romero's sign.  Common hepatic duct is at the  upper limits of normal measuring 0.6 cm. Negative liver. Pancreas is  obscured and not diagnostically visualized. The visualized portion of  the right kidney is unremarkable. No hydronephrosis on the right.        Impression     IMPRESSION:   1. Cholelithiasis without sonographic evidence of cholecystitis.  2. No other acute findings.    FARAZ KITCHEN MD                Clinical Quality Measure: Blood Pressure Screening     Your blood pressure was checked while you were in the emergency department today. The last reading we obtained was  BP: (!) 159/115 . Please read the guidelines below about what these numbers mean and what you should do about them.  If your systolic blood pressure (the top number) is less than 120 and your diastolic blood pressure (the bottom number) is less than 80, then your blood pressure is normal. There is nothing more that you need to do about it.  If your systolic blood pressure (the top number) is 120-139 or your diastolic blood pressure (the bottom number) is 80-89, your blood pressure may be higher than it should be. You should have your blood pressure rechecked within a year by a primary care provider.  If your systolic blood pressure (the top number) is 140 or greater or your diastolic blood pressure (the bottom number) is 90 or greater, you may have high blood pressure. High blood pressure is treatable, but if left untreated over time it can put you at risk for heart attack, stroke, or kidney failure. You should have your blood pressure rechecked by a primary care provider within the next 4 weeks.  If your provider in the emergency department today gave you specific instructions to follow-up with your doctor or provider even sooner than that, you should follow that instruction and not wait for up to 4 weeks for your follow-up visit.        Thank you for choosing [facility]       Thank you for choosing [facility] for your care. Our goal is always to  "provide you with excellent care. Hearing back from our patients is one way we can continue to improve our services. Please take a few minutes to complete the written survey that you may receive in the mail after you visit with us. Thank you!        [employer] Information     [employer] lets you send messages to your doctor, view your test results, renew your prescriptions, schedule appointments and more. To sign up, go to [email or web address] . Click on \"Log in\" on the left side of the screen, which will take you to the Welcome page. Then click on \"Sign up Now\" on the right side of the page.     You will be asked to enter the access code listed below, as well as some personal information. Please follow the directions to create your username and password.     Your access code is: [email or web address]  Expires: 2018  3:26 PM     Your access code will  in 90 days. If you need help or a new code, please call your [facility] clinic or [phone].        Care EveryWhere ID     This is your Care EveryWhere ID. This could be used by other organizations to access your [facility] medical records  [id number]        Equal Access to Services     LUPE RUTLEDGE : Hadii ramon Brito, wadeborada ruben, qamiketa kaalmada jeannette, james dennis. So [facility] [phone].    ATENCIÓN: Si habla español, tiene a beal disposición servicios gratuitos de asistencia lingüística. Jelena al [phone].    We comply with applicable federal civil rights laws and [place] laws. We do not discriminate on the basis of race, color, national origin, age, disability, sex, sexual orientation, or gender identity.            After Visit Summary       This is your record. Keep this with you and show to your community pharmacist(s) and doctor(s) at your next visit.                  "

## 2018-10-06 NOTE — ED AVS SNAPSHOT
Emergency Department    [address] [place] [address]    Phone:  [phone]    Fax:  [phone]                                       Cherry Richards   MRN: [id number]    Department:   Emergency Department   Date of Visit:  10/6/2018           After Visit Summary Signature Page     I have received my discharge instructions, and my questions have been answered. I have discussed any challenges I see with this plan with the nurse or doctor.    ..........................................................................................................................................  Patient/Patient Representative Signature      ..........................................................................................................................................  Patient Representative Print Name and Relationship to Patient    ..................................................               ................................................  Date                                   Time    ..........................................................................................................................................  Reviewed by Signature/Title    ...................................................              ..............................................  Date                                               Time          22EPIC Rev 08/18

## 2018-10-06 NOTE — ED PROVIDER NOTES
"  History     Chief Complaint:  Abdominal Pain    HPI   History is somewhat limited due to patient's pain.    Cherry Richards is a 53 year old male with a history of gallstones who presents with upper abdominal pain. The patient had an appendectomy one week ago. He denies having any abdominal pain post-operation. Last night around 2100, he suddenly began having upper abdominal pain with nausea and vomiting. The patient has vomited 6-7 times since the onset of the pain. The patient's son states the patient took Oxycontin 30 minutes prior to reporting to the ED with no improvement in pain. Upon examination in the ED the patient noticed a rash across on his abdomen as well. The patient denies any flank pain. He denies any fevers, constipation or diarrhea. He has been having normal bowel movements.     Allergies:  No known drug allergies     Medications:    Aspirin PO     Past Medical History:    Gallstones  Acute appendicitis  Leg weakness, bilateral  Vitamin D deficiency  Elevated blood pressure without hypertension diagnosis    Past Surgical History:    Appendectomy    Family History:    Diabetes mellitus    Social History:  Smoking Status: Former Smoker  Alcohol Use: No  Patient presents with son.  Marital Status:       Review of Systems   Constitutional: Negative for fever.   Gastrointestinal: Positive for abdominal pain, nausea and vomiting. Negative for blood in stool, constipation and diarrhea.   Skin: Positive for rash.   All other systems reviewed and are negative.      Physical Exam     Patient Vitals for the past 24 hrs:   BP Temp Temp src Heart Rate Resp SpO2 Height Weight   10/06/18 0446 152/90 - - 58 - 93 % - -   10/06/18 0411 (!) 172/95 - - 55 - 95 % - -   10/06/18 0400 (!) 156/117 - - 56 - 96 % - -   10/06/18 0354 (!) 190/104 98  F (36.7  C) Oral 50 20 98 % 1.702 m (5' 7\") 86.2 kg (190 lb)   10/06/18 0351 (!) 190/104 - - (!) 49 - 97 % - -     Physical Exam    General: Well-nourished, appears stated " age  Eyes: PERRL, conjunctivae pink no scleral icterus or conjunctival injection  ENT:  Moist mucus membranes, pharynx nonerythematous and without exudate  Respiratory:  No respiratory distress, no wheezes, crackles or rales  CV: Normal rate, no murmurs, rubs or gallops  GI: Exquisite right upper quadrant tenderness that extends across his upper abdomen, nondistended, normal bowel sounds, does have an area of what appears to be folliculitis in his epigastrium.  No Rigoberto or Freind signs.  : No CVA tenderness  Skin: Warm, dry.  No rashes or petechiae  Musculoskeletal: No peripheral edema or calf tenderness, strength 5 out of 5 throughout  Neuro: GCS 15, alert and oriented to person/place/time, cranial nerves II through XII intact  Psychiatric: Normal affect      Emergency Department Course     ECG (3:46:24):  Rate 52 bpm. LA interval 166 ms. QRS duration 96 ms. QT/QTc 456/424 ms. P-R-T axes 49 32 70.   Sinus bradycardia.  Possible Left atrial enlargement.  Borderline ECG.  Interpreted at 0515 by Trierweiler, Chad A, MD.    Imaging:  Radiographic findings were communicated with the patient and family who voiced understanding of the findings.    Abdomen US, limited (RUQ only)  No acute sonographic findings in the right upper quadrant.  As read by Radiology.    Laboratory:  CBC: WBC 13.8 (H) o/w WNL (HGB 17.0, )    CMP: Glucose 145 (H) o/w WNL (Creatinine 1.18)    Lipase: 202    Interventions:    0408: Dilaudid 0.5 mg IV  0408: Zofran 4 mg IV  0410: NS 1L IV Bolus    Emergency Department Course:  Past medical records, nursing notes, and vitals reviewed.  0345: I performed an exam of the patient and obtained history, as documented above.    IV inserted and blood drawn.    The patient was sent for an ultrasound while in the emergency department, findings above.    0450: I rechecked the patient. Explained findings to patient and family.    0528: I rechecked the patient. Findings and plan explained to the Patient  and son. Patient discharged home with instructions regarding supportive care, medications, and reasons to return. The importance of close follow-up was reviewed.     Impression & Plan      Medical Decision Making:     Cherry Richards is a 53 year old male who recently underwent an appendectomy who presents with upper abdominal pain abdominal pain.  During the patient's last emergency department evaluation CT was concerning for a single gallstone with possible cholecystitis.  The ultrasound in the Emergency Room this evening is concerning for Coleylithiasis with multiple gallstones the largest measuring up to 1.6 cm without evidence of cholecystitis.  There is no evidence at this point of serious complications  such as gangrenous cholecystitis, septic shock, etc.  No signs of other complications such as CBD stone, ascending cholangitis, gallstone pancreatitis.  Given constellation of symptoms, lab data and ultrasound I doubt GERD, gastritis, PUD, perforated ulcer, diverticulitis, colitis.  The patient's pain has drastically improved with the interventions noted above.  He was informed of his laboratory and imaging results and offered inpatient admission for possible cholecystectomy this weekend.  After a long shared decision making conversation with the patient he elected to go home and follow-up with his surgeon on an outpatient basis for cholecystectomy in the near future.  Plan to discharge the patient with anti-nausea medications.  He still has a large amount of Percocet from his recent appendectomy.  Gallbladder handout provided at discharge and the patient was given strict return precautions including worsening right upper quadrant pain, fever, jaundice or altered mental status.  All of the patient's questions were answered prior to discharge.        Diagnosis:    ICD-10-CM   1. Cholelithiasis without cholecystitis K80.20       Disposition:  Discharged to home.    Discharge Medications:  New Prescriptions    No  medications on file         Yue Peña  10/6/2018    EMERGENCY DEPARTMENT  I, Yue Mariana, am serving as a scribe at 3:45 AM on 10/6/2018 to document services personally performed by Trierweiler, Chad A, MD, Reji Monte PA-C based on my observations and the provider's statements to me.        Reji Monte PA-C  10/06/18 0538

## 2018-10-06 NOTE — DISCHARGE INSTRUCTIONS
Gallstones with Biliary Colic    You have abdominal pain due to irritation and spasm of the gallbladder. This is called biliary colic. The gallbladder is a small sac under the liver, which stores and releases a fluid that aids in the digestion of fat. A collection of crystals may form stones inside the gallbladder (gallstones). Gallstones can cause the gallbladder to spasm. If they block the duct out of the gallbladder, they can cause pain and even an infection.   A number of factors increase the risk for having gallstones:    Being female    Being severely overweight (obese)    Older age    Losing or gaining weight quickly    Eating a high-calorie diet    Being pregnant    Taking hormone therapy    Having diabetes  Home care    Rest in bed.    Drink only clear liquids until you feel better.    You may have been prescribed medicine for pain or nausea. Take these as directed.    Fat in your diet makes the gallbladder contract and may cause increased pain. Avoid foods that are high in fat (such as full-fat dairy, fried foods, and fatty meats) for at least two days.  All of the following are healthy foods for your gallbladder, as well as the rest of your body:  Fresh fruits and vegetables   Whole grains (whole-wheat bread, brown rice, oats, bran cereal)   Lean meat, poultry, and fish   Low-fat dairy products    If you are overweight, talk to your healthcare provider about losing weight.  Follow-up care  Follow up with your healthcare provider or as advised. There is a chance that you will have another episode of pain from your gallstones at some point. Removal of the gallbladder is an option to prevent this. Talk with your healthcare provider about your treatment options.  When to seek medical advice  Call your healthcare provider if any of the following occur:    Worsening pain or pain lasting for longer than 6 hours    Pain moving to the right lower abdomen    Repeated vomiting    Swollen abdomen    Fever of  100.4 F (38 C) or higher, or as directed by your healthcare provider    Very dark urine, light colored stools, or yellow color of the skin or eyes    Chest, arm, back, neck or jaw pain  Date Last Reviewed: 6/18/2015 2000-2017 [employer]. [address] [place] [address]. All rights reserved. This information is not intended as a substitute for professional medical care. Always follow your healthcare professional's instructions.          Discharge Instructions for Gallstones  Gallstones form when liquid stored in the gallbladder hardens into pieces of stone-like material. Stones in the gallbladder may or may not cause symptoms. They can cause pain or infection. You and your healthcare provider will decide on the best treatment for you. Here's what you can do.  Home care  These home care steps can help you after being diagnosed with gallstones:    Eat a low-fat diet.  ? Read food labels to be sure the foods you are choosing are low in fat.  ? Limit the use of high-fat meats, dairy products, animal fats, and vegetable oils.    Keep all appointments with your healthcare provider. Your healthcare provider needs to monitor your condition.    Discuss your treatment choices with your healthcare provider, including:  ? Surgery to remove the gallbladder and gallstones  ? Medicine to dissolve the stones. This is mainly for people who cannot have surgery. Take your medicines exactly as directed. Don't skip doses. Remember, it takes time for the medicine to take effect. Unfortunately, once the medicine is stopped, the gallstones usually come back.   ? ERCP (endoscopic retrograde cholangiopancreatography). A healthcare provider uses a thin tube with video and X-rays to locate stones and remove them from the common bile duct.  Follow-up care  Make a follow-up appointment as directed by our staff.     When to call your healthcare provider  Call your healthcare provider right away if you have any of the  following:    Severe pain in the upper belly, shoulder, or back    Fever of 100.4 F (38 C) or higher, or as directed by your healthcare provider    Nausea or vomiting    Yellowing of your skin or eyes (jaundice)   Date Last Reviewed: 7/1/2016 2000-2017 [employer]. [address] [place] [address]. All rights reserved. This information is not intended as a substitute for professional medical care. Always follow your healthcare professional's instructions.

## 2018-10-06 NOTE — ED NOTES
Bed: ED20  Expected date:   Expected time:   Means of arrival:   Comments:  Triage     Laisha Severino RN  10/06/18 0341

## 2018-11-21 DIAGNOSIS — K80.10 CALCULUS OF GALLBLADDER WITH CHRONIC CHOLECYSTITIS WITHOUT OBSTRUCTION: Primary | ICD-10-CM

## 2018-11-27 ENCOUNTER — OFFICE VISIT (OUTPATIENT)
Dept: SURGERY | Facility: CLINIC | Age: 53
End: 2018-11-27
Payer: COMMERCIAL

## 2018-11-27 VITALS
SYSTOLIC BLOOD PRESSURE: 110 MMHG | HEART RATE: 60 BPM | WEIGHT: 195 LBS | HEIGHT: 67 IN | BODY MASS INDEX: 30.61 KG/M2 | DIASTOLIC BLOOD PRESSURE: 60 MMHG

## 2018-11-27 DIAGNOSIS — K80.20 SYMPTOMATIC CHOLELITHIASIS: Primary | ICD-10-CM

## 2018-11-27 PROCEDURE — 99214 OFFICE O/P EST MOD 30 MIN: CPT | Mod: 25 | Performed by: SURGERY

## 2018-11-27 NOTE — MR AVS SNAPSHOT
"              After Visit Summary   11/27/2018    Cherry Richards    MRN: [id number]           Patient Information     Date Of Birth          1965        Visit Information        Provider Department      11/27/2018 3:00 PM Tae Westbrook MD [facility]       Follow-ups after your visit        Your next 10 appointments already scheduled     Dec 06, 2018   Procedure with Tae Westbrook MD   [facility] (--)    [address]  [facility] [address] [phone]              Who to contact     If you have questions or need follow up information about today's clinic visit or your schedule please contact [facility] directly at [phone].  Normal or non-critical lab and imaging results will be communicated to you by [employer], letter or phone within 4 business days after the clinic has received the results. If you do not hear from us within 7 days, please contact the clinic through [employer] or phone. If you have a critical or abnormal lab result, we will notify you by phone as soon as possible.  Submit refill requests through [employer] or call your pharmacy and they will forward the refill request to us. Please allow 3 business days for your refill to be completed.          Additional Information About Your Visit        [employer] Information     [employer] lets you send messages to your doctor, view your test results, renew your prescriptions, schedule appointments and more. To sign up, go to [email or web address] . Click on \"Log in\" on the left side of the screen, which will take you to the Welcome page. Then click on \"Sign up Now\" on the right side of the page.     You will be asked to enter the access code listed below, as well as some personal information. Please follow the directions to create your username and password.     Your access code is: [email or web address]  Expires: 12/28/2018  2:26 PM   " "  Your access code will  in 90 days. If you need help or a new code, please call your [facility] clinic or [phone].        Care EveryWhere ID     This is your Care EveryWhere ID. This could be used by other organizations to access your [facility] medical records  [id number]        Your Vitals Were     Pulse Height BMI (Body Mass Index)             60 5' 7\" (1.702 m) 30.54 kg/m2          Blood Pressure from Last 3 Encounters:   18 110/60   10/06/18 123/69   10/03/18 132/84    Weight from Last 3 Encounters:   18 195 lb (88.5 kg)   10/06/18 190 lb (86.2 kg)   10/03/18 190 lb (86.2 kg)              Today, you had the following     No orders found for display       Primary Care Provider Office Phone # Fax #    Yadira Alexandria Moulton [phone]       [address] [facility] [address]        Equal Access to Services     [facility]: Hadii ramon ku hadasho Soomaali, waaxda luqadaha, qaybta kaalmada adeegyaflorence, james henry . So [facility] [phone].    ATENCIÓN: Si habla español, tiene a beal disposición servicios gratuitos de asistencia lingüística. [facility] [phone].    We comply with applicable federal civil rights laws and [place] laws. We do not discriminate on the basis of race, color, national origin, age, disability, sex, sexual orientation, or gender identity.            Thank you!     Thank you for choosing [facility]  for your care. Our goal is always to provide you with excellent care. Hearing back from our patients is one way we can continue to improve our services. Please take a few minutes to complete the written survey that you may receive in the mail after your visit with us. Thank you!             Your Updated Medication List - Protect others around you: Learn how to safely use, store and throw away your medicines at www.disposemymeds.org.          This list is accurate as of 18  4:02 PM.  Always use your " most recent med list.                   Brand Name Dispense Instructions for use Diagnosis    ASPIRIN PO      Take 325 mg by mouth daily as needed        ondansetron 4 MG tablet    ZOFRAN    6 tablet    Take 1 tablet (4 mg) by mouth every 8 hours as needed for nausea

## 2018-11-27 NOTE — LETTER
"2018    Re: Cherry Alfarosem - 1965    HPI: Patient is a 53 year old male who is here for consultation requested by Yadira Moulton [phone] for evaluation of cholelithiasis.The patient describes having symptoms for the last 3 weeks.  He recently underwent a laparoscopic appendectomy for appendicitis and recovered well.  He noted right upper quadrant abdominal pain so went to the emergency room.  He had a known history of cholelithiasis but never had symptoms up until then.  He has now had approximately 2 severe episodes after heavy oral intake.  The pain is mainly located in the RUQ area. The patient rates the pain a 5 out of 10.The pain is exacerbated by oral intake. The pain is relieved by fasting.  Patient denies fevers, chills, nausea, vomiting, jaundice, changes in stool or urine, headache, SOB, chest pain.     PMH:  None  PSH:    has a past surgical history that includes Laparoscopic appendectomy (N/A, 2018).  Social History:   reports that he quit smoking about 30 years ago. His smoking use included Cigarettes. He has a 3.00 pack-year smoking history. He has never used smokeless tobacco. He reports that he does not drink alcohol or use illicit drugs.  Family History:   family history is negative for Family History Negative.  Medications/Allergies: Home medications and allergies reviewed.     ROS:  The 10 point Review of Systems is negative other than noted in the HPI.     Physical Exam:  /60  Pulse 60  Ht 5' 7\" (1.702 m)  Wt 195 lb (88.5 kg)  BMI 30.54 kg/m2  GENERAL: Generally appears well.  Psych: Alert and Oriented.  Normal affect  Eyes: Sclera clear  Respiratory:  Lungs clear to ausculation bilaterally with good air excursion  Cardiovascular:  Regular Rate and Rhythm with no murmurs gallops or rubs, normal peripheral pulses  GI: Abdomen Soft Mild tenderness to palpation RUQ No hernias palpated.  Lymphatic/Hematologic/Immune:  No obvious " lymphadenopathy.  Integumentary:  No rashes  Neurological: grossly intact        All new lab and imaging data was reviewed.      Impression and Plan:  Patient is a 53 year old male with cholelithiasis.     PLAN:   I discussed the pathophysiology of gallbladder disease and complications of cholecystitis and choledocholithiasis with the patient.  The patient has symptomatic cholelithiasis with increasing symptoms.  I would recommend going forward with laparoscopic cholecystectomy at his convenience. He will schedule in the near future.  I also discussed the risks associated with the procedure including, but not limited to infection, bleeding, conversion to open, bile leak, bile duct injury, retained gallstones, pneumonia, MI, and anesthesia complications with the patient.  I also discussed if a complication did occur it may require further surgical intervention during or after the procedure. The patient indicated understanding of the discussion, asked appropriate questions, and provided consent. I provided the patient an information pamphlet. I have recommended a low fat diet and instructed the patient to go to ER if they developed persistent pain, persistent nausea and vomiting, or yellowness of skin.        Thank you very much for this consult.     Tae Westbrook M.D.  [facility]  [phone]

## 2018-11-28 ENCOUNTER — TELEPHONE (OUTPATIENT)
Dept: SURGERY | Facility: CLINIC | Age: 53
End: 2018-11-28

## 2018-11-28 NOTE — TELEPHONE ENCOUNTER
Type of surgery: Lap chrissie  Location of surgery: [facility]  Date and time of surgery: 12/6/18 at 12:40pm  Surgeon: Dr. Tae Westbrook  Pre-Op Appt Date: Patient to schedule  Post-Op Appt Date: Patient to schedule   Packet sent out: Yes  Pre-cert/Authorization completed:  Not Applicable  Date: 11/27/18

## 2018-11-28 NOTE — PROGRESS NOTES
"[facility]  Surgery Consultation      HPI: Patient is a 53 year old male who is here for consultation requested by Yadira Moulton [phone] for evaluation of cholelithiasis.The patient describes having symptoms for the last 3 weeks.  He recently underwent a laparoscopic appendectomy for appendicitis and recovered well.  He noted right upper quadrant abdominal pain so went to the emergency room.  He had a known history of cholelithiasis but never had symptoms up until then.  He has now had approximately 2 severe episodes after heavy oral intake.  The pain is mainly located in the RUQ area. The patient rates the pain a 5 out of 10.The pain is exacerbated by oral intake. The pain is relieved by fasting.  Patient denies fevers, chills, nausea, vomiting, jaundice, changes in stool or urine, headache, SOB, chest pain.    PMH:  None  PSH:    has a past surgical history that includes Laparoscopic appendectomy (N/A, 9/29/2018).  Social History:   reports that he quit smoking about 30 years ago. His smoking use included Cigarettes. He has a 3.00 pack-year smoking history. He has never used smokeless tobacco. He reports that he does not drink alcohol or use illicit drugs.  Family History:   family history is negative for Family History Negative.  Medications/Allergies: Home medications and allergies reviewed.    ROS:  The 10 point Review of Systems is negative other than noted in the HPI.    Physical Exam:  /60  Pulse 60  Ht 5' 7\" (1.702 m)  Wt 195 lb (88.5 kg)  BMI 30.54 kg/m2  GENERAL: Generally appears well.  Psych: Alert and Oriented.  Normal affect  Eyes: Sclera clear  Respiratory:  Lungs clear to ausculation bilaterally with good air excursion  Cardiovascular:  Regular Rate and Rhythm with no murmurs gallops or rubs, normal peripheral pulses  GI: Abdomen Soft Mild tenderness to palpation RUQ No hernias palpated.  Lymphatic/Hematologic/Immune:  No obvious " lymphadenopathy.  Integumentary:  No rashes  Neurological: grossly intact      All new lab and imaging data was reviewed.     Impression and Plan:  Patient is a 53 year old male with cholelithiasis.    PLAN:   I discussed the pathophysiology of gallbladder disease and complications of cholecystitis and choledocholithiasis with the patient.  The patient has symptomatic cholelithiasis with increasing symptoms.  I would recommend going forward with laparoscopic cholecystectomy at his convenience.  He will schedule in the near future.  I also discussed the risks associated with the procedure including, but not limited to infection, bleeding, conversion to open, bile leak, bile duct injury, retained gallstones, pneumonia, MI, and anesthesia complications with the patient.  I also discussed if a complication did occur it may require further surgical intervention during or after the procedure. The patient indicated understanding of the discussion, asked appropriate questions, and provided consent. I provided the patient an information pamphlet. I have recommended a low fat diet and instructed the patient to go to ER if they developed persistent pain, persistent nausea and vomiting, or yellowness of skin.      Thank you very much for this consult.    Tae Westbrook M.D.  [facility]  [phone]    Please route or send letter to:  Primary Care Provider (PCP) and Referring Provider

## 2018-12-04 ENCOUNTER — OFFICE VISIT (OUTPATIENT)
Dept: FAMILY MEDICINE | Facility: CLINIC | Age: 53
End: 2018-12-04
Payer: COMMERCIAL

## 2018-12-04 VITALS
SYSTOLIC BLOOD PRESSURE: 122 MMHG | WEIGHT: 194.5 LBS | TEMPERATURE: 97.7 F | DIASTOLIC BLOOD PRESSURE: 82 MMHG | OXYGEN SATURATION: 97 % | BODY MASS INDEX: 30.53 KG/M2 | HEART RATE: 59 BPM | RESPIRATION RATE: 16 BRPM | HEIGHT: 67 IN

## 2018-12-04 DIAGNOSIS — Z11.4 SCREENING FOR HIV (HUMAN IMMUNODEFICIENCY VIRUS): ICD-10-CM

## 2018-12-04 DIAGNOSIS — Z01.818 PREOP GENERAL PHYSICAL EXAM: Primary | ICD-10-CM

## 2018-12-04 DIAGNOSIS — Z11.59 NEED FOR HEPATITIS C SCREENING TEST: ICD-10-CM

## 2018-12-04 DIAGNOSIS — K80.10 CALCULUS OF GALLBLADDER WITH CHRONIC CHOLECYSTITIS WITHOUT OBSTRUCTION: ICD-10-CM

## 2018-12-04 DIAGNOSIS — Z23 NEED FOR PROPHYLACTIC VACCINATION AND INOCULATION AGAINST INFLUENZA: ICD-10-CM

## 2018-12-04 DIAGNOSIS — Z12.11 SCREEN FOR COLON CANCER: ICD-10-CM

## 2018-12-04 PROCEDURE — 99214 OFFICE O/P EST MOD 30 MIN: CPT | Performed by: PHYSICIAN ASSISTANT

## 2018-12-04 NOTE — PROGRESS NOTES
[facility]  [address]  [facility] [place] [address]  [phone]  Dept: [phone]    PRE-OP EVALUATION:  Today's date: 2018    Cherry Richards (: 1965) presents for pre-operative evaluation assessment as requested by Dr. Westbrook.  He requires evaluation and anesthesia risk assessment prior to undergoing surgery/procedure for treatment of galbladder .    Fax number for surgical facility:   Primary Physician: Yadira Moulton  Type of Anesthesia Anticipated: General    Patient has a Health Care Directive or Living Will:  NO    Preop Questions 2018   Who is doing your surgery? unknown   What are you having done? galbladder removal   Date of Surgery/Procedure: 18   1.  Do you have a history of Heart attack, stroke, stent, coronary bypass surgery, or other heart surgery? No   2.  Do you ever have any pain or discomfort in your chest? No   3.  Do you have a history of  Heart Failure? No   4.   Are you troubled by shortness of breath when:  walking on a level surface, or up a slight hill, or at night? No   5.  Do you currently have a cold, bronchitis or other respiratory infection? No   6.  Do you have a cough, shortness of breath, or wheezing? No   7.  Do you sometimes get pains in the calves of your legs when you walk? No   8. Do you or anyone in your family have previous history of blood clots? No   9.  Do you or does anyone in your family have a serious bleeding problem such as prolonged bleeding following surgeries or cuts? No   10. Have you ever had problems with anemia or been told to take iron pills? No   11. Have you had any abnormal blood loss such as black, tarry or bloody stools? No   12. Have you ever had a blood transfusion? No   13. Have you or any of your relatives ever had problems with anesthesia? No   14. Do you have sleep apnea, excessive snoring or daytime drowsiness? No   15. Do you have any prosthetic heart  valves? No   16. Do you have prosthetic joints? No       No problems with anesthesia with recent appendectomy.      HPI:     HPI related to upcoming procedure: Patient has had RUQ pain after eating for the past 3 weeks.  Ultrasound confirms gallstones without cholecystitis.  Patient will proceed with cholecystectomy to improve his quality of life.  He is not taking anything for pain now, and only takes ASA as needed for pain (not daily).      MEDICAL HISTORY:     Patient Active Problem List    Diagnosis Date Noted     S/P appendectomy, follow-up exam 10/03/2018     Priority: Medium     Gallstones 10/03/2018     Priority: Medium     Acute appendicitis 09/29/2018     Priority: Medium     CARDIOVASCULAR SCREENING; LDL GOAL LESS THAN 160 12/15/2015     Priority: Medium     BMI 30-35 12/04/2015     Priority: Medium     Leg weakness, bilateral since 2012 01/15/2015     Priority: Medium     Elevated blood pressure reading without diagnosis of hypertension 01/15/2015     Priority: Medium     Glucose intolerance (impaired glucose tolerance) 01/15/2015     Priority: Medium     Vitamin D deficiency 01/15/2015     Priority: Medium     Family history of diabetes mellitus 09/05/2013     Priority: Medium      No past medical history on file.  Past Surgical History:   Procedure Laterality Date     LAPAROSCOPIC APPENDECTOMY N/A 9/29/2018    Procedure: LAPAROSCOPIC APPENDECTOMY;  LAPAROSCOPIC APPENDECTOMY;  Surgeon: Kassy Thapa MD;  Location:  OR     LAPAROSCOPIC CHOLECYSTECTOMY N/A 12/6/2018    Procedure: LAPAROSCOPIC CHOLECYSTECTOMY;  Surgeon: Tae Westbrook MD;  Location:  OR     Current Outpatient Medications   Medication Sig Dispense Refill     ASPIRIN PO Take 325 mg by mouth daily as needed        HYDROcodone-acetaminophen (NORCO) 5-325 MG tablet Take 1-2 tablets by mouth every 4 hours as needed for moderate to severe pain 15 tablet 0     ondansetron (ZOFRAN) 4 MG tablet Take 1 tablet (4 mg) by mouth every 8  "hours as needed for nausea 6 tablet 0     OTC products: Aspirin    No Known Allergies   Latex Allergy: NO    Social History     Tobacco Use     Smoking status: Former Smoker     Packs/day: 0.50     Years: 6.00     Pack years: 3.00     Types: Cigarettes     Last attempt to quit: 1988     Years since quittin.2     Smokeless tobacco: Never Used   Substance Use Topics     Alcohol use: No     Alcohol/week: 0.0 oz     History   Drug Use No       REVIEW OF SYSTEMS:   Review of Systems   Constitutional: Negative.    HENT: Negative.    Eyes: Negative.    Respiratory: Negative.    Cardiovascular: Negative.    Endocrine: Negative.    Genitourinary: Negative.    Musculoskeletal: Negative.    Skin: Negative.    Neurological: Negative.    Psychiatric/Behavioral: Negative.          EXAM:   /82 (BP Location: Right arm, Patient Position: Sitting, Cuff Size: Adult Regular)   Pulse 59   Temp 97.7  F (36.5  C) (Tympanic)   Resp 16   Ht 1.702 m (5' 7\")   Wt 88.2 kg (194 lb 8 oz)   SpO2 97%   BMI 30.46 kg/m    Physical Exam   Constitutional: He is oriented to person, place, and time. He appears well-developed and well-nourished. No distress.   HENT:   Head: Normocephalic.   Right Ear: External ear normal.   Left Ear: External ear normal.   Nose: Nose normal.   Eyes: Conjunctivae and EOM are normal.   Neck: Normal range of motion.   Cardiovascular: Normal rate, regular rhythm and normal heart sounds.    Pulmonary/Chest: Effort normal and breath sounds normal.   Neurological: He is alert and oriented to person, place, and time.   Skin: He is not diaphoretic.   Psychiatric: He has a normal mood and affect. Judgment normal.       DIAGNOSTICS:   No diagnostics required today.     Patient has an updated EKG from 10/6/2018.    Recent Labs   Lab Test  10/06/18   0403  18   0830   HGB  17.0  16.6   PLT  215  187   NA  140  139   POTASSIUM  3.8  3.8   CR  1.18  1.00        IMPRESSION:   Reason for surgery/procedure: " symptomatic cholelithiasis    The proposed surgical procedure is considered INTERMEDIATE risk.    REVISED CARDIAC RISK INDEX  The patient has the following serious cardiovascular risks for perioperative complications such as (MI, PE, VFib and 3  AV Block):  No serious cardiac risks  INTERPRETATION: 0 risks: Class I (very low risk - 0.4% complication rate)    The patient has the following additional risks for perioperative complications:  No identified additional risks      ICD-10-CM    1. Preop general physical exam Z01.818    2. Calculus of gallbladder with chronic cholecystitis without obstruction K80.10    3. Screen for colon cancer Z12.11    4. Need for hepatitis C screening test Z11.59    5. Screening for HIV (human immunodeficiency virus) Z11.4    6. Need for prophylactic vaccination and inoculation against influenza Z23        RECOMMENDATIONS:       --Patient is to take all scheduled medications on the day of surgery EXCEPT for modifications listed below.    Anticoagulant or Antiplatelet Medication Use  ASPIRIN: Discontinue ASA 7-10 days prior to procedure to reduce bleeding risk.  It should be resumed post-operatively.        APPROVAL GIVEN to proceed with proposed procedure, without further diagnostic evaluation       Signed Electronically by: Yoko Jesus PA-C    Copy of this evaluation report is provided to requesting physician.    [place] Preop Guidelines    Revised Cardiac Risk Index

## 2018-12-04 NOTE — MR AVS SNAPSHOT
After Visit Summary   12/4/2018    Cherry Richards    MRN: [id number]           Patient Information     Date Of Birth          1965        Visit Information        Provider Department      12/4/2018 1:50 PM Yoko Jesus PA-C [facility]        Today's Diagnoses     Preop general physical exam    -  1    Screen for colon cancer        Need for hepatitis C screening test        Screening for HIV (human immunodeficiency virus)        Need for prophylactic vaccination and inoculation against influenza          Care Instructions      Before Your Surgery      Call your surgeon if there is any change in your health. This includes signs of a cold or flu (such as a sore throat, runny nose, cough, rash or fever).    Do not smoke, drink alcohol or take over the counter medicine (unless your surgeon or primary care doctor tells you to) for the 24 hours before and after surgery.    If you take prescribed drugs: Follow your doctor s orders about which medicines to take and which to stop until after surgery.    Eating and drinking prior to surgery: follow the instructions from your surgeon    Take a shower or bath the night before surgery. Use the soap your surgeon gave you to gently clean your skin. If you do not have soap from your surgeon, use your regular soap. Do not shave or scrub the surgery site.  Wear clean pajamas and have clean sheets on your bed.           Follow-ups after your visit        Follow-up notes from your care team     Return in about 1 week (around 12/11/2018) for post op visit as needed.      Your next 10 appointments already scheduled     Dec 06, 2018   Procedure with Tae Westbrook MD   [facility] PeriOP [facility] (--)    [address]  [facility] [address]   [phone]            Dec 06, 2018 12:30 PM CST   [facility] Same Day Surgery with Tae Westbrook MD   [facility]  "Scheduling ([facility])    [facility] Scheduling ([facility])   [phone]              Who to contact     If you have questions or need follow up information about today's clinic visit or your schedule please contact [facility] directly at [phone].  Normal or non-critical lab and imaging results will be communicated to you by MyChart, letter or phone within 4 business days after the clinic has received the results. If you do not hear from us within 7 days, please contact the clinic through MyChart or phone. If you have a critical or abnormal lab result, we will notify you by phone as soon as possible.  Submit refill requests through [employer] or call your pharmacy and they will forward the refill request to us. Please allow 3 business days for your refill to be completed.          Additional Information About Your Visit        Care EveryWhere ID     This is your Care EveryWhere ID. This could be used by other organizations to access your [place] medical records  [id number]        Your Vitals Were     Pulse Temperature Respirations Height Pulse Oximetry BMI (Body Mass Index)    59 97.7  F (36.5  C) (Tympanic) 16 5' 7\" (1.702 m) 97% 30.46 kg/m2       Blood Pressure from Last 3 Encounters:   12/04/18 122/82   11/27/18 110/60   10/06/18 123/69    Weight from Last 3 Encounters:   12/04/18 194 lb 8 oz (88.2 kg)   11/27/18 195 lb (88.5 kg)   10/06/18 190 lb (86.2 kg)              Today, you had the following     No orders found for display       Primary Care Provider Office Phone # Fax #    Yadira Moulton [phone]       [address] [facility] [address]        Equal Access to Services     LUPE RUTLEDGE : Tanya Brito, wadeborada ruben, qaybta kaalmada jeannette, james dennis. So [facility] [phone].    ATENCIÓN: Si habla español, tiene a beal disposición servicios " ivan de asistencia lingüística. Jelena nayak [phone].    We comply with applicable federal civil rights laws and [place] laws. We do not discriminate on the basis of race, color, national origin, age, disability, sex, sexual orientation, or gender identity.            Thank you!     Thank you for choosing [facility]  for your care. Our goal is always to provide you with excellent care. Hearing back from our patients is one way we can continue to improve our services. Please take a few minutes to complete the written survey that you may receive in the mail after your visit with us. Thank you!             Your Updated Medication List - Protect others around you: Learn how to safely use, store and throw away your medicines at www.disposemymeds.org.          This list is accurate as of 12/4/18  2:22 PM.  Always use your most recent med list.                   Brand Name Dispense Instructions for use Diagnosis    ASPIRIN PO      Take 325 mg by mouth daily as needed        ondansetron 4 MG tablet    ZOFRAN    6 tablet    Take 1 tablet (4 mg) by mouth every 8 hours as needed for nausea

## 2018-12-04 NOTE — LETTER
[facility]  [address]  [facility] [place] [facility] [address]  Phone: [phone]  Fax: [phone]    December 4, 2018        Cherry Richards  [address] [address]          To whom it may concern:    RE: Cherry Richards    Patient was seen and treated today at our clinic.  He has surgery scheduled on 12/6/2018.  Please excuse him from work until 12/14/2018.    Please contact me for questions or concerns.      Sincerely,        Yoko Jesus PA-C

## 2018-12-05 ASSESSMENT — ENCOUNTER SYMPTOMS
CARDIOVASCULAR NEGATIVE: 1
CONSTITUTIONAL NEGATIVE: 1
PSYCHIATRIC NEGATIVE: 1
EYES NEGATIVE: 1
RESPIRATORY NEGATIVE: 1
MUSCULOSKELETAL NEGATIVE: 1
ENDOCRINE NEGATIVE: 1
NEUROLOGICAL NEGATIVE: 1

## 2018-12-06 ENCOUNTER — HOSPITAL ENCOUNTER (OUTPATIENT)
Facility: CLINIC | Age: 53
Discharge: HOME | End: 2018-12-06
Attending: SURGERY | Admitting: SURGERY
Payer: COMMERCIAL

## 2018-12-06 ENCOUNTER — ANESTHESIA EVENT (OUTPATIENT)
Dept: SURGERY | Facility: CLINIC | Age: 53
End: 2018-12-06
Payer: COMMERCIAL

## 2018-12-06 ENCOUNTER — OFFICE VISIT (OUTPATIENT)
Dept: SURGERY | Facility: PHYSICIAN GROUP | Age: 53
End: 2018-12-06
Payer: COMMERCIAL

## 2018-12-06 ENCOUNTER — ANESTHESIA (OUTPATIENT)
Dept: SURGERY | Facility: CLINIC | Age: 53
End: 2018-12-06
Payer: COMMERCIAL

## 2018-12-06 VITALS
OXYGEN SATURATION: 97 % | HEIGHT: 68 IN | TEMPERATURE: 96.4 F | SYSTOLIC BLOOD PRESSURE: 140 MMHG | BODY MASS INDEX: 29.37 KG/M2 | DIASTOLIC BLOOD PRESSURE: 78 MMHG | RESPIRATION RATE: 16 BRPM | WEIGHT: 193.8 LBS

## 2018-12-06 DIAGNOSIS — G89.18 ACUTE POST-OPERATIVE PAIN: ICD-10-CM

## 2018-12-06 DIAGNOSIS — K80.20 GALLSTONES: Primary | ICD-10-CM

## 2018-12-06 PROCEDURE — 25000125 ZZHC RX 250: Performed by: ANESTHESIOLOGY

## 2018-12-06 PROCEDURE — 37000009 ZZH ANESTHESIA TECHNICAL FEE, EACH ADDTL 15 MIN: Performed by: SURGERY

## 2018-12-06 PROCEDURE — 27210794 ZZH OR GENERAL SUPPLY STERILE: Performed by: SURGERY

## 2018-12-06 PROCEDURE — 40000170 ZZH STATISTIC PRE-PROCEDURE ASSESSMENT II: Performed by: SURGERY

## 2018-12-06 PROCEDURE — 25000125 ZZHC RX 250: Performed by: NURSE ANESTHETIST, CERTIFIED REGISTERED

## 2018-12-06 PROCEDURE — 25000128 H RX IP 250 OP 636: Performed by: SURGERY

## 2018-12-06 PROCEDURE — 36000056 ZZH SURGERY LEVEL 3 1ST 30 MIN: Performed by: SURGERY

## 2018-12-06 PROCEDURE — 25000125 ZZHC RX 250: Performed by: SURGERY

## 2018-12-06 PROCEDURE — 88304 TISSUE EXAM BY PATHOLOGIST: CPT | Performed by: SURGERY

## 2018-12-06 PROCEDURE — 37000008 ZZH ANESTHESIA TECHNICAL FEE, 1ST 30 MIN: Performed by: SURGERY

## 2018-12-06 PROCEDURE — 25000566 ZZH SEVOFLURANE, EA 15 MIN: Performed by: SURGERY

## 2018-12-06 PROCEDURE — 71000013 ZZH RECOVERY PHASE 1 LEVEL 1 EA ADDTL HR: Performed by: SURGERY

## 2018-12-06 PROCEDURE — 25000132 ZZH RX MED GY IP 250 OP 250 PS 637: Performed by: PHYSICIAN ASSISTANT

## 2018-12-06 PROCEDURE — 88304 TISSUE EXAM BY PATHOLOGIST: CPT | Mod: 26 | Performed by: SURGERY

## 2018-12-06 PROCEDURE — 47562 LAPAROSCOPIC CHOLECYSTECTOMY: CPT | Mod: AS | Performed by: PHYSICIAN ASSISTANT

## 2018-12-06 PROCEDURE — 71000012 ZZH RECOVERY PHASE 1 LEVEL 1 FIRST HR: Performed by: SURGERY

## 2018-12-06 PROCEDURE — 25000128 H RX IP 250 OP 636: Performed by: ANESTHESIOLOGY

## 2018-12-06 PROCEDURE — 25800025 ZZH RX 258: Performed by: SURGERY

## 2018-12-06 PROCEDURE — 25000128 H RX IP 250 OP 636: Performed by: NURSE ANESTHETIST, CERTIFIED REGISTERED

## 2018-12-06 PROCEDURE — 36000058 ZZH SURGERY LEVEL 3 EA 15 ADDTL MIN: Performed by: SURGERY

## 2018-12-06 PROCEDURE — 71000027 ZZH RECOVERY PHASE 2 EACH 15 MINS: Performed by: SURGERY

## 2018-12-06 PROCEDURE — 47562 LAPAROSCOPIC CHOLECYSTECTOMY: CPT | Performed by: SURGERY

## 2018-12-06 RX ORDER — FENTANYL CITRATE 50 UG/ML
25-50 INJECTION, SOLUTION INTRAMUSCULAR; INTRAVENOUS
Status: DISCONTINUED | OUTPATIENT
Start: 2018-12-06 | End: 2018-12-06 | Stop reason: HOSPADM

## 2018-12-06 RX ORDER — LIDOCAINE 40 MG/G
CREAM TOPICAL
Status: DISCONTINUED | OUTPATIENT
Start: 2018-12-06 | End: 2018-12-06 | Stop reason: HOSPADM

## 2018-12-06 RX ORDER — KETOROLAC TROMETHAMINE 30 MG/ML
INJECTION, SOLUTION INTRAMUSCULAR; INTRAVENOUS PRN
Status: DISCONTINUED | OUTPATIENT
Start: 2018-12-06 | End: 2018-12-06

## 2018-12-06 RX ORDER — LIDOCAINE HYDROCHLORIDE 20 MG/ML
INJECTION, SOLUTION INFILTRATION; PERINEURAL PRN
Status: DISCONTINUED | OUTPATIENT
Start: 2018-12-06 | End: 2018-12-06

## 2018-12-06 RX ORDER — HYDROMORPHONE HYDROCHLORIDE 1 MG/ML
.3-.5 INJECTION, SOLUTION INTRAMUSCULAR; INTRAVENOUS; SUBCUTANEOUS EVERY 5 MIN PRN
Status: DISCONTINUED | OUTPATIENT
Start: 2018-12-06 | End: 2018-12-06 | Stop reason: HOSPADM

## 2018-12-06 RX ORDER — CEFAZOLIN SODIUM 2 G/100ML
2 INJECTION, SOLUTION INTRAVENOUS
Status: COMPLETED | OUTPATIENT
Start: 2018-12-06 | End: 2018-12-06

## 2018-12-06 RX ORDER — FENTANYL CITRATE 50 UG/ML
INJECTION, SOLUTION INTRAMUSCULAR; INTRAVENOUS PRN
Status: DISCONTINUED | OUTPATIENT
Start: 2018-12-06 | End: 2018-12-06

## 2018-12-06 RX ORDER — CEFAZOLIN SODIUM 1 G/3ML
1 INJECTION, POWDER, FOR SOLUTION INTRAMUSCULAR; INTRAVENOUS SEE ADMIN INSTRUCTIONS
Status: DISCONTINUED | OUTPATIENT
Start: 2018-12-06 | End: 2018-12-06 | Stop reason: HOSPADM

## 2018-12-06 RX ORDER — ONDANSETRON 4 MG/1
4 TABLET, ORALLY DISINTEGRATING ORAL EVERY 30 MIN PRN
Status: DISCONTINUED | OUTPATIENT
Start: 2018-12-06 | End: 2018-12-06 | Stop reason: HOSPADM

## 2018-12-06 RX ORDER — SODIUM CHLORIDE, SODIUM LACTATE, POTASSIUM CHLORIDE, CALCIUM CHLORIDE 600; 310; 30; 20 MG/100ML; MG/100ML; MG/100ML; MG/100ML
INJECTION, SOLUTION INTRAVENOUS CONTINUOUS PRN
Status: DISCONTINUED | OUTPATIENT
Start: 2018-12-06 | End: 2018-12-06

## 2018-12-06 RX ORDER — ONDANSETRON 2 MG/ML
4 INJECTION INTRAMUSCULAR; INTRAVENOUS EVERY 30 MIN PRN
Status: DISCONTINUED | OUTPATIENT
Start: 2018-12-06 | End: 2018-12-06 | Stop reason: HOSPADM

## 2018-12-06 RX ORDER — HYDROCODONE BITARTRATE AND ACETAMINOPHEN 5; 325 MG/1; MG/1
1 TABLET ORAL
Status: COMPLETED | OUTPATIENT
Start: 2018-12-06 | End: 2018-12-06

## 2018-12-06 RX ORDER — MAGNESIUM HYDROXIDE 1200 MG/15ML
LIQUID ORAL PRN
Status: DISCONTINUED | OUTPATIENT
Start: 2018-12-06 | End: 2018-12-06 | Stop reason: HOSPADM

## 2018-12-06 RX ORDER — ONDANSETRON 2 MG/ML
INJECTION INTRAMUSCULAR; INTRAVENOUS PRN
Status: DISCONTINUED | OUTPATIENT
Start: 2018-12-06 | End: 2018-12-06

## 2018-12-06 RX ORDER — SODIUM CHLORIDE, SODIUM LACTATE, POTASSIUM CHLORIDE, CALCIUM CHLORIDE 600; 310; 30; 20 MG/100ML; MG/100ML; MG/100ML; MG/100ML
INJECTION, SOLUTION INTRAVENOUS CONTINUOUS
Status: DISCONTINUED | OUTPATIENT
Start: 2018-12-06 | End: 2018-12-06 | Stop reason: HOSPADM

## 2018-12-06 RX ORDER — PROPOFOL 10 MG/ML
INJECTION, EMULSION INTRAVENOUS PRN
Status: DISCONTINUED | OUTPATIENT
Start: 2018-12-06 | End: 2018-12-06

## 2018-12-06 RX ORDER — VECURONIUM BROMIDE 1 MG/ML
INJECTION, POWDER, LYOPHILIZED, FOR SOLUTION INTRAVENOUS PRN
Status: DISCONTINUED | OUTPATIENT
Start: 2018-12-06 | End: 2018-12-06

## 2018-12-06 RX ORDER — DEXAMETHASONE SODIUM PHOSPHATE 4 MG/ML
INJECTION, SOLUTION INTRA-ARTICULAR; INTRALESIONAL; INTRAMUSCULAR; INTRAVENOUS; SOFT TISSUE PRN
Status: DISCONTINUED | OUTPATIENT
Start: 2018-12-06 | End: 2018-12-06

## 2018-12-06 RX ORDER — HYDROCODONE BITARTRATE AND ACETAMINOPHEN 5; 325 MG/1; MG/1
1-2 TABLET ORAL EVERY 4 HOURS PRN
Qty: 15 TABLET | Refills: 0 | Status: SHIPPED | OUTPATIENT
Start: 2018-12-06 | End: 2019-07-25

## 2018-12-06 RX ADMIN — SUGAMMADEX 200 MG: 100 INJECTION, SOLUTION INTRAVENOUS at 13:40

## 2018-12-06 RX ADMIN — FENTANYL CITRATE 50 MCG: 50 INJECTION, SOLUTION INTRAMUSCULAR; INTRAVENOUS at 14:23

## 2018-12-06 RX ADMIN — VECURONIUM BROMIDE 0.5 MG: 1 INJECTION, POWDER, LYOPHILIZED, FOR SOLUTION INTRAVENOUS at 13:30

## 2018-12-06 RX ADMIN — HYDROCODONE BITARTRATE AND ACETAMINOPHEN 1 TABLET: 5; 325 TABLET ORAL at 15:07

## 2018-12-06 RX ADMIN — ONDANSETRON 4 MG: 2 INJECTION INTRAMUSCULAR; INTRAVENOUS at 13:37

## 2018-12-06 RX ADMIN — ROCURONIUM BROMIDE 50 MG: 10 INJECTION INTRAVENOUS at 12:55

## 2018-12-06 RX ADMIN — HYDROMORPHONE HYDROCHLORIDE 0.5 MG: 1 INJECTION, SOLUTION INTRAMUSCULAR; INTRAVENOUS; SUBCUTANEOUS at 13:30

## 2018-12-06 RX ADMIN — CEFAZOLIN SODIUM 2 G: 2 INJECTION, SOLUTION INTRAVENOUS at 12:59

## 2018-12-06 RX ADMIN — FENTANYL CITRATE 50 MCG: 50 INJECTION, SOLUTION INTRAMUSCULAR; INTRAVENOUS at 12:51

## 2018-12-06 RX ADMIN — PROPOFOL 200 MG: 10 INJECTION, EMULSION INTRAVENOUS at 12:55

## 2018-12-06 RX ADMIN — SODIUM CHLORIDE, POTASSIUM CHLORIDE, SODIUM LACTATE AND CALCIUM CHLORIDE: 600; 310; 30; 20 INJECTION, SOLUTION INTRAVENOUS at 12:48

## 2018-12-06 RX ADMIN — KETOROLAC TROMETHAMINE 30 MG: 30 INJECTION, SOLUTION INTRAMUSCULAR at 13:37

## 2018-12-06 RX ADMIN — LIDOCAINE HYDROCHLORIDE 100 MG: 20 INJECTION, SOLUTION INFILTRATION; PERINEURAL at 12:55

## 2018-12-06 RX ADMIN — MIDAZOLAM 2 MG: 1 INJECTION INTRAMUSCULAR; INTRAVENOUS at 12:48

## 2018-12-06 RX ADMIN — DEXAMETHASONE SODIUM PHOSPHATE 4 MG: 4 INJECTION, SOLUTION INTRA-ARTICULAR; INTRALESIONAL; INTRAMUSCULAR; INTRAVENOUS; SOFT TISSUE at 13:37

## 2018-12-06 RX ADMIN — FENTANYL CITRATE 50 MCG: 50 INJECTION, SOLUTION INTRAMUSCULAR; INTRAVENOUS at 12:54

## 2018-12-06 RX ADMIN — LIDOCAINE HYDROCHLORIDE 0.2 ML: 10 INJECTION, SOLUTION EPIDURAL; INFILTRATION; INTRACAUDAL; PERINEURAL at 11:53

## 2018-12-06 ASSESSMENT — COPD QUESTIONNAIRES: COPD: 0

## 2018-12-06 ASSESSMENT — LIFESTYLE VARIABLES: TOBACCO_USE: 1

## 2018-12-06 ASSESSMENT — ENCOUNTER SYMPTOMS: SEIZURES: 0

## 2018-12-06 NOTE — H&P (VIEW-ONLY)
[facility]  [address]  [place] [address]  [phone]  Dept: [phone]    PRE-OP EVALUATION:  Today's date: 2018    Cherry Richards (: 1965) presents for pre-operative evaluation assessment as requested by Dr. Westbrook.  He requires evaluation and anesthesia risk assessment prior to undergoing surgery/procedure for treatment of galbladder .    Fax number for surgical facility:   Primary Physician: Yadira Moulton  Type of Anesthesia Anticipated: General    Patient has a Health Care Directive or Living Will:  NO    Preop Questions 2018   Who is doing your surgery? unknown   What are you having done? galbladder removal   Date of Surgery/Procedure: 18   1.  Do you have a history of Heart attack, stroke, stent, coronary bypass surgery, or other heart surgery? No   2.  Do you ever have any pain or discomfort in your chest? No   3.  Do you have a history of  Heart Failure? No   4.   Are you troubled by shortness of breath when:  walking on a level surface, or up a slight hill, or at night? No   5.  Do you currently have a cold, bronchitis or other respiratory infection? No   6.  Do you have a cough, shortness of breath, or wheezing? No   7.  Do you sometimes get pains in the calves of your legs when you walk? No   8. Do you or anyone in your family have previous history of blood clots? No   9.  Do you or does anyone in your family have a serious bleeding problem such as prolonged bleeding following surgeries or cuts? No   10. Have you ever had problems with anemia or been told to take iron pills? No   11. Have you had any abnormal blood loss such as black, tarry or bloody stools? No   12. Have you ever had a blood transfusion? No   13. Have you or any of your relatives ever had problems with anesthesia? No   14. Do you have sleep apnea, excessive snoring or daytime drowsiness? No   15. Do you have any prosthetic heart  valves? No   16. Do you have prosthetic joints? No       No problems with anesthesia with recent appendectomy.      HPI:     HPI related to upcoming procedure: Patient has had RUQ pain after eating for the past 3 weeks.  Ultrasound confirms gallstones without cholecystitis.  Patient will proceed with cholecystectomy to improve his quality of life.  He is not taking anything for pain now, and only takes ASA as needed for pain (not daily).      MEDICAL HISTORY:     Patient Active Problem List    Diagnosis Date Noted     S/P appendectomy, follow-up exam 10/03/2018     Priority: Medium     Gallstones 10/03/2018     Priority: Medium     Acute appendicitis 09/29/2018     Priority: Medium     CARDIOVASCULAR SCREENING; LDL GOAL LESS THAN 160 12/15/2015     Priority: Medium     BMI 30-35 12/04/2015     Priority: Medium     Leg weakness, bilateral since 2012 01/15/2015     Priority: Medium     Elevated blood pressure reading without diagnosis of hypertension 01/15/2015     Priority: Medium     Glucose intolerance (impaired glucose tolerance) 01/15/2015     Priority: Medium     Vitamin D deficiency 01/15/2015     Priority: Medium     Family history of diabetes mellitus 09/05/2013     Priority: Medium      No past medical history on file.  Past Surgical History:   Procedure Laterality Date     LAPAROSCOPIC APPENDECTOMY N/A 9/29/2018    Procedure: LAPAROSCOPIC APPENDECTOMY;  LAPAROSCOPIC APPENDECTOMY;  Surgeon: Kassy Thapa MD;  Location:  OR     Current Outpatient Prescriptions   Medication Sig Dispense Refill     ASPIRIN PO Take 325 mg by mouth daily as needed        ondansetron (ZOFRAN) 4 MG tablet Take 1 tablet (4 mg) by mouth every 8 hours as needed for nausea (Patient not taking: Reported on 11/27/2018) 6 tablet 0     OTC products: Aspirin    No Known Allergies   Latex Allergy: NO    Social History   Substance Use Topics     Smoking status: Former Smoker     Packs/day: 0.50     Years: 6.00     Types: Cigarettes      Quit date: 9/29/1988     Smokeless tobacco: Never Used     Alcohol use No     History   Drug Use No       REVIEW OF SYSTEMS:   Review of Systems   Constitutional: Negative.    HENT: Negative.    Eyes: Negative.    Respiratory: Negative.    Cardiovascular: Negative.    Endocrine: Negative.    Genitourinary: Negative.    Musculoskeletal: Negative.    Skin: Negative.    Neurological: Negative.    Psychiatric/Behavioral: Negative.          EXAM:   There were no vitals taken for this visit.  Physical Exam   Constitutional: He is oriented to person, place, and time. He appears well-developed and well-nourished. No distress.   HENT:   Head: Normocephalic.   Right Ear: External ear normal.   Left Ear: External ear normal.   Nose: Nose normal.   Eyes: Conjunctivae and EOM are normal.   Neck: Normal range of motion.   Cardiovascular: Normal rate, regular rhythm and normal heart sounds.    Pulmonary/Chest: Effort normal and breath sounds normal.   Neurological: He is alert and oriented to person, place, and time.   Skin: He is not diaphoretic.   Psychiatric: He has a normal mood and affect. Judgment normal.       DIAGNOSTICS:   No diagnostics required today.     Patient has an updated EKG from 10/6/2018.    Recent Labs   Lab Test  10/06/18   0403  09/29/18   0830   HGB  17.0  16.6   PLT  215  187   NA  140  139   POTASSIUM  3.8  3.8   CR  1.18  1.00        IMPRESSION:   Reason for surgery/procedure: symptomatic cholelithiasis    The proposed surgical procedure is considered INTERMEDIATE risk.    REVISED CARDIAC RISK INDEX  The patient has the following serious cardiovascular risks for perioperative complications such as (MI, PE, VFib and 3  AV Block):  No serious cardiac risks  INTERPRETATION: 0 risks: Class I (very low risk - 0.4% complication rate)    The patient has the following additional risks for perioperative complications:  No identified additional risks      ICD-10-CM    1. Preop general physical exam Z01.818         RECOMMENDATIONS:       --Patient is to take all scheduled medications on the day of surgery EXCEPT for modifications listed below.    Anticoagulant or Antiplatelet Medication Use  ASPIRIN: Discontinue ASA 7-10 days prior to procedure to reduce bleeding risk.  It should be resumed post-operatively.        APPROVAL GIVEN to proceed with proposed procedure, without further diagnostic evaluation       Signed Electronically by: Yoko Jesus PA-C    Copy of this evaluation report is provided to requesting physician.    [place] Preop Guidelines    Revised Cardiac Risk Index

## 2018-12-06 NOTE — IP AVS SNAPSHOT
MRN:[id number]                      After Visit Summary   12/6/2018    Cherry Richards    MRN: [id number]           Thank you!     Thank you for choosing [facility] for your care. Our goal is always to provide you with excellent care. Hearing back from our patients is one way we can continue to improve our services. Please take a few minutes to complete the written survey that you may receive in the mail after you visit with us. Thank you!        Patient Information     Date Of Birth          1965        About your hospital stay     You were admitted on:  December 6, 2018 You last received care in the:  [facility] PACU    You were discharged on:  December 6, 2018       Who to Call     For medical emergencies, please call 911.  For non-urgent questions about your medical care, please call your primary care provider or clinic, [phone]  For questions related to your surgery, please call your surgery clinic        Attending Provider     Provider Tae Jiang MD Surgery       Primary Care Provider Office Phone # Fax #    Yadira Alexandria [place] [phone]      Further instructions from your care team         Today you received Toradol, an antiinflammatory medication similar to Ibuprofen.  You should not take other antiinflammatory medication, such as Ibuprofen, Motrin, Advil, Aleve, Naprosyn, etc until 8:00 pm tonight.       Same Day Surgery Discharge Instructions for  Sedation and General Anesthesia       It's not unusual to feel dizzy, light-headed or faint for up to 24 hours after surgery or while taking pain medication.  If you have these symptoms: sit for a few minutes before standing and have someone assist you when you get up to walk or use the bathroom.      You should rest and relax for the next 24 hours. We recommend you make arrangements to have an adult stay with you for at least 24 hours after your discharge.  Avoid hazardous and  strenuous activity.      DO NOT DRIVE any vehicle or operate mechanical equipment for 24 hours following the end of your surgery.  Even though you may feel normal, your reactions may be affected by the medication you have received.      Do not drink alcoholic beverages for 24 hours following surgery.       Slowly progress to your regular diet as you feel able. It's not unusual to feel nauseated and/or vomit after receiving anesthesia.  If you develop these symptoms, drink clear liquids (apple juice, ginger ale, broth, 7-up, etc. ) until you feel better.  If your nausea and vomiting persists for 24 hours, please notify your surgeon.        All narcotic pain medications, along with inactivity and anesthesia, can cause constipation. Drinking plenty of liquids and increasing fiber intake will help.      For any questions of a medical nature, call your surgeon.      Do not make important decisions for 24 hours.      If you had general anesthesia, you may have a sore throat for a couple of days related to the breathing tube used during surgery.  You may use Cepacol lozenges to help with this discomfort.  If it worsens or if you develop a fever, contact your surgeon.       If you feel your pain is not well managed with the pain medications prescribed by your surgeon, please contact your surgeon's office to let them know so they can address your concerns.             [facility]  Discharge Instructions: Post-Operative Laparoscopic Cholecystectomy    ACTIVITY    Expect to feel tired after your surgery.  This will gradually resolve.      Take frequent, short walks and increase your activity gradually.      Avoid strenuous physical activity or heavy lifting greater than 15-20 lbs. for 2-3 weeks.  You may climb stairs.    You may drive without restrictions when you are not using any prescription pain medication and feel comfortable in a car.    You may return to work/school when you are  comfortable without any prescription pain medication.    WOUND CARE    You may remove your outer dressing or Band-Aids and shower 48 hours after the surgery.  Pat your incisions dry and leave them open to air.  Re-apply dressing (Band-Aids or gauze/tape) as needed for comfort or drainage.    You may have steri-strips (looks like white tape) on your incision.  You may peel off the steri-strips 2 weeks after your surgery if they have not peeled off on their own.     Do not soak your incisions in a tub or pool for 2 weeks.     Do not apply any lotions, creams, or ointments to your incisions.    A ridge under your incisions is normal and will gradually resolve.    DIET    Start with liquids, then gradually resume your regular diet as tolerated.  Avoid heavy, spicy, and greasy meals for 2-3 days.    Drink plenty of fluids to stay hydrated.    It is not uncommon to experience some loose stools or diarrhea after surgery.  This is your body s way of adapting to the bile which will slowly drain into your intestine.  A low fat diet may help with this.  This should improve over 1-2 months.    PAIN    Expect some tenderness and discomfort at the incision sites.  Use the prescribed pain medication at your discretion.  Expect gradual resolution of your pain over several days.    You may take ibuprofen with food (unless you have been told not to) instead of or in addition to your prescribed pain medication.  If you are taking Norco or Percocet, do not take any additional acetaminophen/APAP/Tylenol.    Do not drink alcohol or drive while you are taking pain medications.    You may apply ice to your incisions in 20 minute intervals as needed for the next 48 hours.  After that time, consider switching to heat if you prefer.    EXPECTATIONS    Pain medications can cause constipation.  Limit use when possible.  Take over the counter stool softener/stimulant, such as Colace or Senna, 1-2 times a day with plenty of water.  You may take a  "mild over the counter laxative, such as Miralax or a suppository, as needed.  You may discontinue these medications once you are having regular bowel movements and/or are no longer taking your narcotic pain medication.      You may have shoulder or upper back discomfort due to the gas used in surgery.  This is temporary and should resolve in 48-72 hours.  Short, frequent walks may help with this.    FOLLOW UP    Our office will contact you approximately 2 weeks to check on your progress and answer any questions you may have.  If you are doing well, you will not need to return for a follow up appointment.  If any concerns are identified over the phone, we will help you make an appointment to see a provider.     If you have not received a phone call, have any questions or concerns, or would like to be seen, please call us at [phone] and ask to speak with our nurse.  We are located at [address] [place] [address].    CALL OUR OFFICE -[phone] IF YOU HAVE:     Chills or fever above 101 F.    Increased redness, warmth, or drainage at your incisions.    Significant bleeding.    Pain not relieved by your pain medication or rest.    Increasing pain after the first 48 hours.    Any other concerns or questions.    Revised January 2018          **If you have questions or concerns about your procedure,   call Dr. Westbrook at [phone]**    Pending Results     Date and Time Order Name Status Description    12/6/2018 1329 Surgical pathology exam In process             Admission Information     Date & Time Provider Department Dept. Phone    12/6/2018 Tae Westbrook MD [facility] PACU [phone]      Your Vitals Were     Blood Pressure Temperature Respirations Height Weight Pulse Oximetry    150/92 96.3  F (35.7  C) 12 1.727 m (5' 8\") 87.9 kg (193 lb 12.8 oz) 98%    BMI (Body Mass Index)                   29.47 kg/m2           Care EveryWhere ID     This is your Care EveryWhere " ID. This could be used by other organizations to access your [place] medical records  [id number]        Equal Access to Services     LUPE RUTLEDGE : Tanya Brito, wadeborada bclukeha, karenta kajulietada kristaljanetflorence, james greenedonnaann-marie dennis. So [facility] [phone].    ATENCIÓN: Si habla español, tiene a beal disposición servicios gratuitos de asistencia lingüística. Jackelyname al [phone].    We comply with applicable federal civil rights laws and [place] laws. We do not discriminate on the basis of race, color, national origin, age, disability, sex, sexual orientation, or gender identity.               Review of your medicines      START taking        Dose / Directions    HYDROcodone-acetaminophen 5-325 MG tablet   Commonly known as:  NORCO   Used for:  Gallstones, Acute post-operative pain   Notes to Patient:  One tablet at 3:07pm        Dose:  1-2 tablet   Take 1-2 tablets by mouth every 4 hours as needed for moderate to severe pain   Quantity:  15 tablet   Refills:  0         CONTINUE these medicines which have NOT CHANGED        Dose / Directions    ASPIRIN PO        Dose:  325 mg   Take 325 mg by mouth daily as needed   Refills:  0       ondansetron 4 MG tablet   Commonly known as:  ZOFRAN        Dose:  4 mg   Take 1 tablet (4 mg) by mouth every 8 hours as needed for nausea   Quantity:  6 tablet   Refills:  0            Where to get your medicines      Some of these will need a paper prescription and others can be bought over the counter. Ask your nurse if you have questions.     Bring a paper prescription for each of these medications     HYDROcodone-acetaminophen 5-325 MG tablet                Protect others around you: Learn how to safely use, store and throw away your medicines at www.disposemymeds.org.        Information about OPIOIDS     PRESCRIPTION OPIOIDS: WHAT YOU NEED TO KNOW   We gave you an opioid (narcotic) pain medicine. It is important to manage your pain, but opioids  are not always the best choice. You should first try all the other options your care team gave you. Take this medicine for as short a time (and as few doses) as possible.    Some activities can increase your pain, such as bandage changes or therapy sessions. It may help to take your pain medicine 30 to 60 minutes before these activities. Reduce your stress by getting enough sleep, working on hobbies you enjoy and practicing relaxation or meditation. Talk to your care team about ways to manage your pain beyond prescription opioids.    These medicines have risks:    DO NOT drive when on new or higher doses of pain medicine. These medicines can affect your alertness and reaction times, and you could be arrested for driving under the influence (DUI). If you need to use opioids long-term, talk to your care team about driving.    DO NOT operate heavy machinery    DO NOT do any other dangerous activities while taking these medicines.    DO NOT drink any alcohol while taking these medicines.     If the opioid prescribed includes acetaminophen, DO NOT take with any other medicines that contain acetaminophen. Read all labels carefully. Look for the word  acetaminophen  or  Tylenol.  Ask your pharmacist if you have questions or are unsure.    You can get addicted to pain medicines, especially if you have a history of addiction (chemical, alcohol or substance dependence). Talk to your care team about ways to reduce this risk.    All opioids tend to cause constipation. Drink plenty of water and eat foods that have a lot of fiber, such as fruits, vegetables, prune juice, apple juice and high-fiber cereal. Take a laxative (Miralax, milk of magnesia, Colace, Senna) if you don t move your bowels at least every other day. Other side effects include upset stomach, sleepiness, dizziness, throwing up, tolerance (needing more of the medicine to have the same effect), physical dependence and slowed breathing.    Store your pills in a  secure place, locked if possible. We will not replace any lost or stolen medicine. If you don t finish your medicine, please throw away (dispose) as directed by your pharmacist. The [place] [employer]ution [employer] has more information about safe disposal: https://www.pca.[facility].us/living-green/managing-unwanted-medications             Medication List: This is a list of all your medications and when to take them. Check marks below indicate your daily home schedule. Keep this list as a reference.      Medications           Morning Afternoon Evening Bedtime As Needed    ASPIRIN PO   Take 325 mg by mouth daily as needed                                HYDROcodone-acetaminophen 5-325 MG tablet   Commonly known as:  NORCO   Take 1-2 tablets by mouth every 4 hours as needed for moderate to severe pain   Last time this was given:  1 tablet on 12/6/2018  3:07 PM   Notes to Patient:  One tablet at 3:07pm                                ondansetron 4 MG tablet   Commonly known as:  ZOFRAN   Take 1 tablet (4 mg) by mouth every 8 hours as needed for nausea

## 2018-12-06 NOTE — OP NOTE
General Surgery Operative Note    PREOPERATIVE DIAGNOSIS:  Cholelithiasis.    POSTOPERATIVE DIAGNOSIS:  Cholelithiasis.    PROCEDURE:  Laparoscopic Cholecystectomy    SURGEON:  Tae Westbrook M.D.    ASSISTANT:  Rosas William PA-C    ANESTHESIA:  General.    BLOOD LOSS: 5 cc    INDICATIONS:   Mr. Richards presented with cholelithiasis and is now presenting for laparoscopic cholecystectomy. I explained the risks, benefits, complications including but not limited to bleeding, infection, possible need to open, possible postop hematoma, seroma, bowel, bladder or bile duct injury, MI, PE, and if any of these occurred the patient would require additional procedures. The patient agreed and did sign consent.    DETAILS OF PROCEDURE: The patient was brought to the operating room per Anesthesia, placed in supine position, and intubated without difficulty. A Surgical timeout was then performed, verifying the correct surgeon, site, procedure, and patient and all in the room were in agreement. 1% lidocaine and 0.25% were injected into infraumbilical area. A skin incision was made and was carried down to the anterior fascia. The fascia was grasped with 2 Kocher's and sharply incised. An open Alfredo technique was used to get intra-abdominal cavity. The camera was inserted and revealed no trocar injuries. Local was injected to the upper abdomen and 2  5's were placed in the subxiphoid and right upper quadrant under direct visualization. The gallbladder was retracted superiorly and laterally. The gallbladder was not significantly inflamed. Cautery was used to take down the peritoneal attachments. I was able to delineate the artery and duct and got under the undersurface of the gallbladder to help declinate the duct and artery further. This was taken up high to confirm the critical view on multiple views. Once I was comfortable that there was 1 duct and 1 artery going straight into the gallbladder, I clipped the duct 2 times  proximally and 1 distally. This was cut with scissors. The artery was done in a similar manner. The gallbladder was taken off the liver bed with cautery. There was no bile spillage or significant bleeding. The gallbladder was then placed in an Endo catch bag and removed through the umbilical trocar site. The camera was reinserted which showed no bleeding or bile spillage. Copious irrigation were used until clear. The wound bed was inspected multiple times showing that it was completely dry and that the clips were in place. The omentum was packed into the perihepatic fossa. All gas was expelled and the trocars were taken out under direct visualization. The fascia closed with 0 Vicryl in figure-of-eight fashion. Marcaine 0.25% were injected to all the wounds. The skin was closed with a 4-0 Monocryl in a subcuticular fashion. Steri-strips and sterile dressings were applied. The patient tolerated the procedure well. There were no complications. They were awoken from anesthesia and transferred to PACU in stable condition. All sponge, instrument and needle counts were correct. The physician assistant was medically necessary to assist in prepping, positioning, camera operation, retraction/exposure, and closure of the port sites.     ABDULLAHI HOLLIS MD     Please route or send letter to:  Primary Care Provider (PCP) and Referring Provider

## 2018-12-06 NOTE — DISCHARGE INSTRUCTIONS
Today you received Toradol, an antiinflammatory medication similar to Ibuprofen.  You should not take other antiinflammatory medication, such as Ibuprofen, Motrin, Advil, Aleve, Naprosyn, etc until 8:00 pm tonight.       Same Day Surgery Discharge Instructions for  Sedation and General Anesthesia       It's not unusual to feel dizzy, light-headed or faint for up to 24 hours after surgery or while taking pain medication.  If you have these symptoms: sit for a few minutes before standing and have someone assist you when you get up to walk or use the bathroom.      You should rest and relax for the next 24 hours. We recommend you make arrangements to have an adult stay with you for at least 24 hours after your discharge.  Avoid hazardous and strenuous activity.      DO NOT DRIVE any vehicle or operate mechanical equipment for 24 hours following the end of your surgery.  Even though you may feel normal, your reactions may be affected by the medication you have received.      Do not drink alcoholic beverages for 24 hours following surgery.       Slowly progress to your regular diet as you feel able. It's not unusual to feel nauseated and/or vomit after receiving anesthesia.  If you develop these symptoms, drink clear liquids (apple juice, ginger ale, broth, 7-up, etc. ) until you feel better.  If your nausea and vomiting persists for 24 hours, please notify your surgeon.        All narcotic pain medications, along with inactivity and anesthesia, can cause constipation. Drinking plenty of liquids and increasing fiber intake will help.      For any questions of a medical nature, call your surgeon.      Do not make important decisions for 24 hours.      If you had general anesthesia, you may have a sore throat for a couple of days related to the breathing tube used during surgery.  You may use Cepacol lozenges to help with this discomfort.  If it worsens or if you develop a fever, contact your surgeon.       If you feel  your pain is not well managed with the pain medications prescribed by your surgeon, please contact your surgeon's office to let them know so they can address your concerns.             [facility] - [facility]  Discharge Instructions: Post-Operative Laparoscopic Cholecystectomy    ACTIVITY    Expect to feel tired after your surgery.  This will gradually resolve.      Take frequent, short walks and increase your activity gradually.      Avoid strenuous physical activity or heavy lifting greater than 15-20 lbs. for 2-3 weeks.  You may climb stairs.    You may drive without restrictions when you are not using any prescription pain medication and feel comfortable in a car.    You may return to work/school when you are comfortable without any prescription pain medication.    WOUND CARE    You may remove your outer dressing or Band-Aids and shower 48 hours after the surgery.  Pat your incisions dry and leave them open to air.  Re-apply dressing (Band-Aids or gauze/tape) as needed for comfort or drainage.    You may have steri-strips (looks like white tape) on your incision.  You may peel off the steri-strips 2 weeks after your surgery if they have not peeled off on their own.     Do not soak your incisions in a tub or pool for 2 weeks.     Do not apply any lotions, creams, or ointments to your incisions.    A ridge under your incisions is normal and will gradually resolve.    DIET    Start with liquids, then gradually resume your regular diet as tolerated.  Avoid heavy, spicy, and greasy meals for 2-3 days.    Drink plenty of fluids to stay hydrated.    It is not uncommon to experience some loose stools or diarrhea after surgery.  This is your body s way of adapting to the bile which will slowly drain into your intestine.  A low fat diet may help with this.  This should improve over 1-2 months.    PAIN    Expect some tenderness and discomfort at the incision sites.  Use the prescribed pain  medication at your discretion.  Expect gradual resolution of your pain over several days.    You may take ibuprofen with food (unless you have been told not to) instead of or in addition to your prescribed pain medication.  If you are taking Norco or Percocet, do not take any additional acetaminophen/APAP/Tylenol.    Do not drink alcohol or drive while you are taking pain medications.    You may apply ice to your incisions in 20 minute intervals as needed for the next 48 hours.  After that time, consider switching to heat if you prefer.    EXPECTATIONS    Pain medications can cause constipation.  Limit use when possible.  Take over the counter stool softener/stimulant, such as Colace or Senna, 1-2 times a day with plenty of water.  You may take a mild over the counter laxative, such as Miralax or a suppository, as needed.  You may discontinue these medications once you are having regular bowel movements and/or are no longer taking your narcotic pain medication.      You may have shoulder or upper back discomfort due to the gas used in surgery.  This is temporary and should resolve in 48-72 hours.  Short, frequent walks may help with this.    FOLLOW UP    Our office will contact you approximately 2 weeks to check on your progress and answer any questions you may have.  If you are doing well, you will not need to return for a follow up appointment.  If any concerns are identified over the phone, we will help you make an appointment to see a provider.     If you have not received a phone call, have any questions or concerns, or would like to be seen, please call us at [phone] and ask to speak with our nurse.  We are located at [address] [place] [address].    CALL OUR OFFICE -[phone] IF YOU HAVE:     Chills or fever above 101 F.    Increased redness, warmth, or drainage at your incisions.    Significant bleeding.    Pain not relieved by your pain medication or rest.    Increasing pain  after the first 48 hours.    Any other concerns or questions.    Revised January 2018          **If you have questions or concerns about your procedure,   call Dr. Westbrook at [phone]**

## 2018-12-06 NOTE — ANESTHESIA CARE TRANSFER NOTE
Patient: Cherry Richards    Procedure(s):  LAPAROSCOPIC CHOLECYSTECTOMY    Diagnosis: GALLSTONES   Diagnosis Additional Information: No value filed.    Anesthesia Type:   General     Note:  Airway :Face Mask  Patient transferred to:PACU  Handoff Report: Identifed the Patient, Identified the Reponsible Provider, Reviewed the pertinent medical history, Discussed the surgical course, Reviewed Intra-OP anesthesia mangement and issues during anesthesia, Set expectations for post-procedure period and Allowed opportunity for questions and acknowledgement of understanding      Vitals: (Last set prior to Anesthesia Care Transfer)    CRNA VITALS  12/6/2018 1324 - 12/6/2018 1401      12/6/2018             Pulse: 88    SpO2: 99 %                Electronically Signed By: Ramandeep Fernandez  December 6, 2018  2:01 PM

## 2018-12-06 NOTE — ANESTHESIA PREPROCEDURE EVALUATION
Procedure: Procedure(s):  LAPAROSCOPIC CHOLECYSTECTOMY  Preop diagnosis: GALLSTONES     No Known Allergies  History reviewed. No pertinent past medical history.  Past Surgical History:   Procedure Laterality Date     LAPAROSCOPIC APPENDECTOMY N/A 9/29/2018    Procedure: LAPAROSCOPIC APPENDECTOMY;  LAPAROSCOPIC APPENDECTOMY;  Surgeon: Kassy Thapa MD;  Location:  OR     Social History   Substance Use Topics     Smoking status: Former Smoker     Packs/day: 0.50     Years: 6.00     Types: Cigarettes     Quit date: 9/29/1988     Smokeless tobacco: Never Used     Alcohol use No     Prior to Admission medications    Medication Sig Start Date End Date Taking? Authorizing Provider   ASPIRIN PO Take 325 mg by mouth daily as needed     Reported, Patient   ondansetron (ZOFRAN) 4 MG tablet Take 1 tablet (4 mg) by mouth every 8 hours as needed for nausea  Patient not taking: Reported on 11/27/2018 10/6/18   Reji Monte PA-C     Current Facility-Administered Medications Ordered in Epic   Medication Dose Route Frequency Last Rate Last Dose     ceFAZolin (ANCEF) 1 g vial to attach to  ml bag for ADULT or 50 ml bag for PEDS  1 g Intravenous See Admin Instructions         ceFAZolin (ANCEF) intermittent infusion 2 g in 100 mL dextrose PRE-MIX  2 g Intravenous Pre-Op/Pre-procedure x 1 dose         lidocaine (LMX4) cream   Topical Q1H PRN         lidocaine 1 % 1 mL  1 mL Other Q1H PRN   0.2 mL at 12/06/18 1153     sodium chloride (PF) 0.9% PF flush 3 mL  3 mL Intracatheter Q1H PRN   3 mL at 12/06/18 1153     sodium chloride (PF) 0.9% PF flush 3 mL  3 mL Intracatheter Q8H         No current [facility]-ordered outpatient prescriptions on file.       Wt Readings from Last 1 Encounters:   12/06/18 87.9 kg (193 lb 12.8 oz)     Temp Readings from Last 1 Encounters:   12/06/18 35.8  C (96.5  F) (Oral)     BP Readings from Last 6 Encounters:   12/06/18 154/72   12/04/18 122/82   11/27/18 110/60   10/06/18 123/69   10/03/18  132/84   09/29/18 116/74     Pulse Readings from Last 4 Encounters:   12/04/18 59   11/27/18 60   10/03/18 62   09/29/18 72     Resp Readings from Last 1 Encounters:   12/06/18 16     SpO2 Readings from Last 1 Encounters:   12/06/18 98%     Recent Labs   Lab Test  10/06/18   0403  09/29/18   0830   NA  140  139   POTASSIUM  3.8  3.8   CHLORIDE  103  103   CO2  31  29   ANIONGAP  6  7   GLC  145*  106*   BUN  20  16   CR  1.18  1.00   TIFFANIE  8.9  8.7     Recent Labs   Lab Test  10/06/18   0403  09/29/18   0830   AST  12  12   ALT  22  26   ALKPHOS  86  97   BILITOTAL  0.5  0.9   LIPASE  202   --      Recent Labs   Lab Test  10/06/18   0403  09/29/18   0830   WBC  13.8*  13.3*   HGB  17.0  16.6   PLT  215  187         Anesthesia Evaluation     . Pt has had prior anesthetic. Type: General (Glidescope)           ROS/MED HX    ENT/Pulmonary:     (+)tobacco use, Past use , . .   (-) asthma and COPD   Neurologic:      (-) seizures and CVA   Cardiovascular: Comment: Elevated BP without diagnosis of HTN    (+) ----. : . . . :. . Previous cardiac testing date:results:date: results:ECG reviewed date:2018 results:NSR date: results:          METS/Exercise Tolerance:     Hematologic:         Musculoskeletal:         GI/Hepatic:     (+) cholecystitis/cholelithiasis,      (-) GERD and liver disease   Renal/Genitourinary:      (-) renal disease   Endo: Comment: BMI 30  Impaired fasting glucose    (+) Obesity, .      Psychiatric:         Infectious Disease:         Malignancy:         Other:                     Physical Exam  Normal systems: dental    Airway   Mallampati: III  TM distance: >3 FB  Neck ROM: full    Dental     Cardiovascular   Rhythm and rate: regular      Pulmonary    breath sounds clear to auscultation                    Anesthesia Plan      History & Physical Review  History and physical reviewed and following examination; no interval change.    ASA Status:  2 .    NPO Status:  > 8 hours    Plan for General and ETT  with Intravenous and Propofol induction.   PONV prophylaxis:  Ondansetron (or other 5HT-3) and Dexamethasone or Solumedrol  Additional equipment: Videolaryngoscope      Postoperative Care      Consents  Anesthetic plan, risks, benefits and alternatives discussed with:  Patient..                          .

## 2018-12-06 NOTE — ANESTHESIA POSTPROCEDURE EVALUATION
Patient: Cherry Richards    Procedure(s):  LAPAROSCOPIC CHOLECYSTECTOMY    Diagnosis:GALLSTONES   Diagnosis Additional Information: No value filed.    Anesthesia Type:  General    Note:  Anesthesia Post Evaluation    Patient location during evaluation: PACU  Patient participation: Able to fully participate in evaluation  Level of consciousness: awake, awake and alert and responsive to verbal stimuli  Pain management: adequate  Airway patency: patent  Cardiovascular status: acceptable  Respiratory status: acceptable  Hydration status: acceptable  PONV: none     Anesthetic complications: None          Last vitals:  Vitals:    12/06/18 1415 12/06/18 1430 12/06/18 1445   BP: 146/78 (!) 148/91 150/83   Resp: 8 15 8   Temp: 35.2  C (95.4  F) 36.1  C (96.9  F) 35.5  C (95.9  F)   SpO2: (!) 88% 92% 98%         Electronically Signed By: Makayla Rivera  December 6, 2018  2:51 PM

## 2018-12-06 NOTE — IP AVS SNAPSHOT
[place] [address]    [place] [address]    Phone:  [phone]                                       After Visit Summary   12/6/2018    Cherry Richards    MRN: [id number]           After Visit Summary Signature Page     I have received my discharge instructions, and my questions have been answered. I have discussed any challenges I see with this plan with the nurse or doctor.    ..........................................................................................................................................  Patient/Patient Representative Signature      ..........................................................................................................................................  Patient Representative Print Name and Relationship to Patient    ..................................................               ................................................  Date                                   Time    ..........................................................................................................................................  Reviewed by Signature/Title    ...................................................              ..............................................  Date                                               Time          22EPIC Rev 08/18

## 2018-12-06 NOTE — BRIEF OP NOTE
[facility] Brief Operative Note    Pre-operative diagnosis: GALLSTONES    Post-operative diagnosis Cholelithiasis     Procedure: Procedure(s):  LAPAROSCOPIC CHOLECYSTECTOMY   Surgeon(s): Surgeon(s) and Role:     * Tae Westbrook MD - Primary     * Rosas William PA-C - Assisting   Estimated blood loss: 5 mL    Specimens:   ID Type Source Tests Collected by Time Destination   A :  Tissue Gallbladder and Contents SURGICAL PATHOLOGY EXAM Tae Westbrook MD 12/6/2018  1:23 PM       Findings: See Operative Report for full details.  No complications noted.       
breastfeeding exclusively

## 2018-12-07 LAB — COPATH REPORT: NORMAL

## 2018-12-11 ENCOUNTER — OFFICE VISIT (OUTPATIENT)
Dept: FAMILY MEDICINE | Facility: CLINIC | Age: 53
End: 2018-12-11
Payer: COMMERCIAL

## 2018-12-11 VITALS
RESPIRATION RATE: 12 BRPM | DIASTOLIC BLOOD PRESSURE: 88 MMHG | BODY MASS INDEX: 28.95 KG/M2 | HEIGHT: 68 IN | OXYGEN SATURATION: 98 % | SYSTOLIC BLOOD PRESSURE: 130 MMHG | HEART RATE: 69 BPM | TEMPERATURE: 99.2 F | WEIGHT: 191 LBS

## 2018-12-11 DIAGNOSIS — Z90.49 HX LAPAROSCOPIC CHOLECYSTECTOMY: ICD-10-CM

## 2018-12-11 DIAGNOSIS — Z12.5 SCREENING FOR PROSTATE CANCER: ICD-10-CM

## 2018-12-11 DIAGNOSIS — E66.09 CLASS 1 OBESITY DUE TO EXCESS CALORIES WITH SERIOUS COMORBIDITY AND BODY MASS INDEX (BMI) OF 30.0 TO 30.9 IN ADULT: ICD-10-CM

## 2018-12-11 DIAGNOSIS — R73.01 IMPAIRED FASTING GLUCOSE: ICD-10-CM

## 2018-12-11 DIAGNOSIS — Z13.220 LIPID SCREENING: Primary | ICD-10-CM

## 2018-12-11 DIAGNOSIS — E66.811 CLASS 1 OBESITY DUE TO EXCESS CALORIES WITH SERIOUS COMORBIDITY AND BODY MASS INDEX (BMI) OF 30.0 TO 30.9 IN ADULT: ICD-10-CM

## 2018-12-11 DIAGNOSIS — Z11.59 NEED FOR HEPATITIS C SCREENING TEST: ICD-10-CM

## 2018-12-11 DIAGNOSIS — Z79.82 ASPIRIN LONG-TERM USE: ICD-10-CM

## 2018-12-11 PROCEDURE — 99214 OFFICE O/P EST MOD 30 MIN: CPT | Performed by: FAMILY MEDICINE

## 2018-12-11 ASSESSMENT — MIFFLIN-ST. JEOR: SCORE: 1675.87

## 2018-12-11 NOTE — PROGRESS NOTES
SUBJECTIVE:   Cherry Richards is a 53 year old male who presents to clinic today for the following health issues:    Hospital Follow-up Visit:    Hospital/Nursing Home/IP Rehab Facility: [facility]  Date of Admission: 12/06/18  Date of Discharge: 12/06/18  Reason(s) for Admission: Laparoscopy Chrissie  9-29-18 had lap appy and cholilithiasis discovered with that workup             Problems taking medications regularly:  Yes       Medication changes since discharge: None       Problems adhering to non-medication therapy:  None    Summary of hospitalization:  [facility] discharge summary reviewed  Diagnostic Tests/Treatments reviewed.  Follow up needed: see surgeon by 12-16-18   Other Healthcare Providers Involved in Patient s Care:         None  Update since discharge: improved. But still abdom wall pain from the surgery     Post Discharge Medication Reconciliation: discharge medications reconciled, continue medications without change.  Plan of care communicated with patient and family     Coding guidelines for this visit:  Type of Medical   Decision Making Face-to-Face Visit       within 7 Days of discharge Face-to-Face Visit        within 14 days of discharge   Moderate Complexity [id number]   High Complexity [id number]        Abdominal Pain      Duration: onsets/po lap chrissie 12-6-18     In hospital for it 12-6-18 9-29-18 lap appy     12=6-18 lap chrissie     Cholelithiasis discovered per work up for appy but surgery delaid as could not do 2 at one time     Description (location/character/radiation): Gallstone removed       Associated flank pain: None    Intensity:  Severe abdom pain up with movement and bruising around lap scars     Accompanying signs and symptoms:        Fever/Chills: no        Gas/Bloating: no        Nausea/vomitting: YES       Diarrhea: no        Dysuria or Hematuria: no     History (previous similar pain/trauma/previous testing): None    Precipitating or alleviating  "factors:       Pain worse with eating/BM/urination: None       Pain relieved by BM: no     Therapies tried and outcome: Surgery as above       Glucose Intolerance  Follow-up      Patient is checking blood sugars: not at all    FBS to 110     Diabetic concerns: None     Symptoms of hypoglycemia (low blood sugar): none     Paresthesias (numbness or burning in feet) or sores: No     Date of last diabetic eye exam: 2018    BP Readings from Last 2 Encounters:   12/11/18 130/88   12/06/18 140/78     LDL Cholesterol Calculated (mg/dL)   Date Value   09/05/2013 87       Diabetes Management Resources    Problem list and histories reviewed & adjusted, as indicated.  Additional history: as documented    Labs reviewed in EPIC    Reviewed and updated as needed this visit by clinical staff  Tobacco  Allergies  Meds  Problems  Med Hx  Surg Hx  Fam Hx  Soc Hx        Reviewed and updated as needed this visit by Provider  Tobacco  Allergies  Meds  Problems  Med Hx  Surg Hx  Fam Hx         ROS:  CONSTITUTIONAL: NEGATIVE for fever, chills, change in weight  INTEGUMENTARY/SKIN: NEGATIVE for worrisome rashes, moles or lesions  EYES: NEGATIVE for vision changes or irritation  ENT/MOUTH: NEGATIVE for ear, mouth and throat problems  RESP: NEGATIVE for significant cough or SOB  BREAST: NEGATIVE for masses, tenderness or discharge  CV: NEGATIVE for chest pain, palpitations or peripheral edema  GI: POSITIVE for , abdominal pain generalized- luzma at site o incisions  and poor appetite  : NEGATIVE for frequency, dysuria, or hematuria  MUSCULOSKELETAL: NEGATIVE for significant arthralgias or myalgia  NEURO: NEGATIVE for weakness, dizziness or paresthesias  ENDOCRINE: NEGATIVE for temperature intolerance, skin/hair changes  HEME: NEGATIVE for bleeding problems  PSYCHIATRIC: NEGATIVE for changes in mood or affect    OBJECTIVE:     /88   Pulse 69   Temp 99.2  F (37.3  C) (Tympanic)   Resp 12   Ht 1.727 m (5' 8\")   Wt 86.6 " kg (191 lb)   SpO2 98%   BMI 29.04 kg/m    Body mass index is 29.04 kg/m .  GENERAL: alert, abdom pain =  distress, obese and fatigued  EYES: Eyes grossly normal to inspection, PERRL and conjunctivae and sclerae normal  RESP: lungs clear to auscultation - no rales, rhonchi or wheezes  CV: regular rate and rhythm, normal S1 S2, no S3 or S4, no murmur, click or rub, no peripheral edema and peripheral pulses strong  ABDOMEN: soft, nontender, no hepatosplenomegaly, no masses and bowel sounds normal- abdom wall tender luzma where has subcutaneous heme to Lt of umbilicus and umbil incision   MS: no gross musculoskeletal defects noted, no edema  SKIN: no suspicious lesions or rashes  NEURO: Normal strength and tone, mentation intact and speech normal  PSYCH: mentation appears normal, affect normal/but disressed with pain and weakness       ASSESSMENT/PLAN:               ICD-10-CM    1. Lipid screening Z13.220 Lipid panel reflex to direct LDL Fasting     CANCELED: Lipid panel reflex to direct LDL Fasting     CANCELED: ALT   2. Hx laparoscopic cholecystectomy 12-6-18 -issue of abdominal wall pain post op  Z90.49 **Hepatitis C Screen Reflex to RNA FUTURE anytime     **ALT FUTURE anytime   3. Impaired fasting glucose R73.01    4. Aspirin long-term use-issue of starting  Z79.82    5. Class 1 obesity due to excess calories with serious comorbidity and body mass index (BMI) of 30.0 to 30.9 in adult E66.09 Lipid panel reflex to direct LDL Fasting    Z68.30 **ALT FUTURE anytime     CANCELED: ALT   6. Need for hepatitis C screening test Z11.59 **Hepatitis C Screen Reflex to RNA FUTURE anytime     **ALT FUTURE anytime     CANCELED: Hepatitis C Screen Reflex to HCV RNA Quant and Genotype   7. Screening for prostate cancer Z12.5 **Prostate spec antigen screen FUTURE anytime     CANCELED: Prostate spec antigen screen       Patient Instructions   1. Gradually advance the diet     2. Off work Dec 6 to 20, 2018 with RTW 12-21    3. You are  overdue for a colonscopy     4. Do the stool test for blood    5. . Weight Loss Tips  1. Do not eat after 6 hrs before your expected bedtime  2. Have your heaviest meal for breakfast, a slightly lighter meal at lunch and a snack 6 hrs before bed  3. No sugar/calorie drinks except milk ie no fruit juice, pop, alcohol.  4. Drink milk 30min before meals to decrease your hunger. Also it is excellent as part of your last meal of the day snack  5. Drink lots of water  6. Increase fiber in diet: all bran cereal, salads, popcorn etc  7. Have only one small serving of fruit a day about 1/2 cup (as this is high in sugar)  8. EXERCISE is the bottom line. Without it, you will gain weight even on a low calorie diet. Best if done 2-3X a day as can    Being overweight contributes to high blood pressure and high cholesterol, both of which cause heart attacks, strokes and kidney failure, prediabetes and diabetes, arthritis, and liver disease     5. No difference was  Noted by patients in a double blind study when given codeine, tylenol ( acetaminophen) or ibuprofen (all in identical pills). They felt no difference in pain relief. Since ibuprofen and the NSAIDs  causes kidney damage, esophageal damage with heartburn, and can increase the risk of esophageal and stomach cancer and ulcers,and colonic strictures. They also cause increased risk of heart attack .   I recommend that you use tylenol(acetaminophen) for pain. Use the acetaminophen ES  Which has 500mgm/tablet You can take up to 2 tablets 4 times a day as need for pain.  If this is not enough, you can add in ibuprofen or aleve(naprosyn) with 2 glasses of fluid and some food-to protect the stomach and esophagus. Please let us know if you are continuing to take ibuprofen or aleve, as we will need to periodically check your kidney function with a blood test.    6. Aspirin  Take 81 mgm a day to prevent heart attack     7. Discussed all with pt  And the patient expresses  understanding  And wife re th amt of force conducted thru the small incisions and the reason for pain and bruising     8. See surgeon 12-16-18 for suture removal and zach [facility] pt that this is normal for lap procedures, as the tubes go in at the incision sites but move around in the entire abdomen causing the bruising and pain     RISK OF READMISSION     - low as is gradually improving and now understands the recovery period and what to expect     Yadira Moulton MD  [facility]

## 2018-12-11 NOTE — PATIENT INSTRUCTIONS
1. Gradually advance the diet     2. Off work Dec 6 to 20, 2018 with RTW 12-21    3. You are overdue for a colonscopy     4. Do the stool test for blood    5. . Weight Loss Tips  1. Do not eat after 6 hrs before your expected bedtime  2. Have your heaviest meal for breakfast, a slightly lighter meal at lunch and a snack 6 hrs before bed  3. No sugar/calorie drinks except milk ie no fruit juice, pop, alcohol.  4. Drink milk 30min before meals to decrease your hunger. Also it is excellent as part of your last meal of the day snack  5. Drink lots of water  6. Increase fiber in diet: all bran cereal, salads, popcorn etc  7. Have only one small serving of fruit a day about 1/2 cup (as this is high in sugar)  8. EXERCISE is the bottom line. Without it, you will gain weight even on a low calorie diet. Best if done 2-3X a day as can    Being overweight contributes to high blood pressure and high cholesterol, both of which cause heart attacks, strokes and kidney failure, prediabetes and diabetes, arthritis, and liver disease     5. No difference was  Noted by patients in a double blind study when given codeine, tylenol ( acetaminophen) or ibuprofen (all in identical pills). They felt no difference in pain relief. Since ibuprofen and the NSAIDs  causes kidney damage, esophageal damage with heartburn, and can increase the risk of esophageal and stomach cancer and ulcers,and colonic strictures. They also cause increased risk of heart attack .   I recommend that you use tylenol(acetaminophen) for pain. Use the acetaminophen ES  Which has 500mgm/tablet You can take up to 2 tablets 4 times a day as need for pain.  If this is not enough, you can add in ibuprofen or aleve(naprosyn) with 2 glasses of fluid and some food-to protect the stomach and esophagus. Please let us know if you are continuing to take ibuprofen or aleve, as we will need to periodically check your kidney function with a blood test.    6. Aspirin  Take 81 mgm a day  to prevent heart attack     7. Discussed all with pt  And the patient expresses understanding  And wife re th amt of force conducted thru the small incisions and the reason for pain and bruising     8. See surgeon 12-16-18 for suture removal and chek

## 2018-12-11 NOTE — NURSING NOTE
"Chief Complaint   Patient presents with     Hospital F/U     Abdominal Pain     BP (!) 150/100   Pulse 69   Temp 99.2  F (37.3  C) (Tympanic)   Resp 12   Ht 1.727 m (5' 8\")   Wt 86.6 kg (191 lb)   SpO2 98%   BMI 29.04 kg/m   Estimated body mass index is 29.04 kg/m  as calculated from the following:    Height as of this encounter: 1.727 m (5' 8\").    Weight as of this encounter: 86.6 kg (191 lb).  BP completed using cuff size: corby Panchal CMA    Health Maintenance Due   Topic Date Due     DTAP/TDAP/TD IMMUNIZATION (1 - Tdap) 06/10/1970     HIV SCREEN (SYSTEM ASSIGNED)  06/10/1981     HEPATITIS C SCREENING  06/10/1981     COLON CANCER SCREEN (SYSTEM ASSIGNED)  06/10/2013     ZOSTER IMMUNIZATION (1 of 2) 06/10/2013     ADVANCE DIRECTIVE PLANNING Q5 YRS  06/10/2018     INFLUENZA VACCINE (1) 09/01/2018     LIPID SCREEN Q5 YR MALE (SYSTEM ASSIGNED)  09/05/2018     Health Maintenance reviewed at today's visit patient asked to schedule/complete:   Colon Cancer:  Patient agrees to schedule  Immunizations:  Patient agrees to schedule    "

## 2018-12-11 NOTE — LETTER
[facility]  [address]  [place] [address]  [phone]                                                                                                           [facility]  [address] [address]    December 11, 2018      Dear Cherry,    Seen by me today.    Off work Dec 6-29, 2018 .     Thank you for choosing [facility].  We appreciate the opportunity to serve you and look forward to supporting your healthcare needs in the future.    If you have any questions or concerns, please call me or my staff at [phone].      Sincerely,    Yadira Moulton MD

## 2018-12-18 ENCOUNTER — OFFICE VISIT (OUTPATIENT)
Dept: SURGERY | Facility: CLINIC | Age: 53
End: 2018-12-18
Payer: COMMERCIAL

## 2018-12-18 DIAGNOSIS — Z09 SURGERY FOLLOW-UP EXAMINATION: Primary | ICD-10-CM

## 2018-12-18 PROCEDURE — 99024 POSTOP FOLLOW-UP VISIT: CPT | Performed by: SURGERY

## 2018-12-18 NOTE — LETTER
2018    Re: Cherry Richards - 1965    Surgery Postoperative Note     Doing well. Tolerating diet. Having regular Bowel movements. Pain controlled without narcotics but still has occasional RUQ tenderness which is improving.     Abdomen Soft Mild tenderness to palpation RUQ No hernias palpated. Incision-Healing well No erythema      A/P  Cherry Richards is recovering well from a Laparoscopic Cholecystectomy. Recovery as expected.  I advised him to slowly return to regular activity. I expect he will make a complete recovery without issues.     Thank you for the opportunity to help in his care.     Tae Westbrook M.D.  [facility]  [phone]

## 2018-12-18 NOTE — PROGRESS NOTES
Surgery Postoperative Note    Doing well. Tolerating diet. Having regular Bowel movements. Pain controlled without narcotics but still has occasional RUQ tenderness which is improving.    Abdomen Soft Mild tenderness to palpation RUQ No hernias palpated. Incision-Healing well No erythema     A/P  Cherry Richards is recovering well from a Laparoscopic Cholecystectomy. Recovery as expected.  I advised him to slowly return to regular activity. I expect he will make a complete recovery without issues.    Thank you for the opportunity to help in his care.    Tae Westbrook M.D.  [facility]  [phone]    Please route or send letter to:  Primary Care Provider (PCP) and Referring Provider

## 2019-07-25 ENCOUNTER — OFFICE VISIT (OUTPATIENT)
Dept: FAMILY MEDICINE | Facility: CLINIC | Age: 54
End: 2019-07-25
Payer: COMMERCIAL

## 2019-07-25 VITALS
OXYGEN SATURATION: 97 % | TEMPERATURE: 97.8 F | SYSTOLIC BLOOD PRESSURE: 120 MMHG | HEART RATE: 59 BPM | HEIGHT: 68 IN | BODY MASS INDEX: 29.1 KG/M2 | RESPIRATION RATE: 18 BRPM | WEIGHT: 192 LBS | DIASTOLIC BLOOD PRESSURE: 80 MMHG

## 2019-07-25 DIAGNOSIS — R73.01 IMPAIRED FASTING GLUCOSE: ICD-10-CM

## 2019-07-25 DIAGNOSIS — D17.30 LIPOMA OF SKIN AND SUBCUTANEOUS TISSUE: Primary | ICD-10-CM

## 2019-07-25 DIAGNOSIS — I10 BENIGN ESSENTIAL HYPERTENSION: ICD-10-CM

## 2019-07-25 DIAGNOSIS — Z12.5 SCREENING FOR PROSTATE CANCER: ICD-10-CM

## 2019-07-25 DIAGNOSIS — E66.3 OVERWEIGHT (BMI 25.0-29.9): ICD-10-CM

## 2019-07-25 LAB — HBA1C MFR BLD: 5 % (ref 0–5.6)

## 2019-07-25 PROCEDURE — 36415 COLL VENOUS BLD VENIPUNCTURE: CPT | Performed by: FAMILY MEDICINE

## 2019-07-25 PROCEDURE — G0103 PSA SCREENING: HCPCS | Performed by: FAMILY MEDICINE

## 2019-07-25 PROCEDURE — 99214 OFFICE O/P EST MOD 30 MIN: CPT | Performed by: FAMILY MEDICINE

## 2019-07-25 PROCEDURE — 83036 HEMOGLOBIN GLYCOSYLATED A1C: CPT | Performed by: FAMILY MEDICINE

## 2019-07-25 ASSESSMENT — MIFFLIN-ST. JEOR: SCORE: 1675.41

## 2019-07-25 NOTE — LETTER
July 29, 2019      Cherry Richards  [address] [address]        Dear ,    We are writing to inform you of your test results.    They are all normal     THE FOLLOWING ARE EXPLANATIONS OF SOME OF OUR LAB TESTS     YOU DID NOT NECESSARILY HAVE ALL OF THESE DONE     Hgb is the blood iron level   WBC means White Blood Cells   Platelets are small blood    cells that help with forming the blood clots along with other blood factors.   Electrolytes are Sodium, Potassium, Calcium, Magnesium, Phosphorus.   Liver tests are: AST, ALT, Bilirubin, Alkaline Phosphatase.   Kidney tests are Creatinine, GFR.   HDL Choles   terol - is the good cholesterol and it is good to have it high.   LDL cholesterol is the bad cholesterol and it is good to have it low.   It is recommended to have LDL less than 130 for people with hypertension and to have it less than 100 for people with   heart disease, diabetes and chronic kidney disease.   Triglycerides are another type of lipid that can cause heart disease, like the cholesterol and should be kept low   Thyroid tests are TSH, T4, T3   Glucose is sugar.   A1c is a test that gives us an idea    about how well was controlled the diabetes for the last 3 months.   PSA stands for Prostate Specific Antigen and it can be elevated with prostate cancer or prostate inflammation.     Please continue on the same medications     Resulted Orders   Prostate spec antigen screen   Result Value Ref Range    PSA 1.28 0 - 4 ug/L      Comment:      Assay Method:  Chemiluminescence using Siemens Vista analyzer   Hemoglobin A1c   Result Value Ref Range    Hemoglobin A1C 5.0 0 - 5.6 %      Comment:      Normal <5.7% Prediabetes 5.7-6.4%  Diabetes 6.5% or higher - adopted from ADA   consensus guidelines.         If you have any questions or concerns, please call the clinic at the number listed above.       Sincerely,        Yadira Moulton MD

## 2019-07-25 NOTE — NURSING NOTE
"Chief Complaint   Patient presents with     Derm Problem     /80   Pulse 59   Temp 97.8  F (36.6  C) (Tympanic)   Resp 18   Ht 1.727 m (5' 8\")   Wt 87.1 kg (192 lb)   SpO2 97%   BMI 29.19 kg/m   Estimated body mass index is 29.19 kg/m  as calculated from the following:    Height as of this encounter: 1.727 m (5' 8\").    Weight as of this encounter: 87.1 kg (192 lb).  BP completed using cuff size: regular   Ariane Panchal CMA    Health Maintenance Due   Topic Date Due     HEPATITIS C SCREENING  1965     ADVANCE CARE PLANNING  1965     COLONOSCOPY  06/10/1973     HIV SCREENING  06/10/1978     ZOSTER IMMUNIZATION (1 of 2) 06/10/2013     PREVENTIVE CARE VISIT  09/05/2014     LIPID  09/05/2018     Health Maintenance reviewed at today's visit patient asked to schedule/complete:   Routine Health Visit: Patient agrees to schedule  Colon Cancer:  Patient agrees to schedule  Immunizations:  Patient agrees to schedule    "

## 2019-07-25 NOTE — PROGRESS NOTES
Subjective     Cherry Richards is a 54 year old male who presents to clinic today for the following health issues:    HPI     Lipoma of inner Rt dominant upper arm       Duration: X 4-5yrs    Description (location/character/radiation): Lump on Left Posterior Upper Arm    Intensity:  severe    Accompanying signs and symptoms: Itching and Pain    History (similar episodes/previous evaluation): None    Precipitating or alleviating factors: None    Therapies tried and outcome: None     Hypertension Follow-Up      Do you check your blood pressure regularly outside of the clinic? No     Are you following a low salt diet? Yes    Are your blood pressures ever more than 140 on the top number (systolic) OR more   than 90 on the bottom number (diastolic), for example 140/90? Yes   Pt refuses Rx     OVERWEIGHT  -BMI= 29  --sedentary life style   -comorbid hi sugar, HTN ,       Glucose Intolerance Follow-Up      Patient is checking blood sugars: not at all    FBS to 110     Diabetic concerns: None     Symptoms of hypoglycemia (low blood sugar): none     Paresthesias (numbness or burning in feet) or sores: No     Date of last diabetic eye exam: 2018    BP Readings from Last 2 Encounters:   12/11/18 130/88   12/06/18 140/78     LDL Cholesterol Calculated (mg/dL)   Date Value   09/05/2013 87   Diabetes Management Resources      Amount of exercise or physical activity: None    Problems taking medications regularly: No    Medication side effects: none    Diet: regular (no restrictions)              Reviewed and updated as needed this visit by Provider  Tobacco  Allergies  Meds  Problems  Med Hx  Surg Hx  Fam Hx         Review of Systems   ROS COMP: CONSTITUTIONAL: NEGATIVE for fever, chills, change in weight  INTEGUMENTARY/SKIN: NEGATIVE for worrisome rashes, moles or lesions-- POS lump Rt arm  EYES: NEGATIVE for vision changes or irritation  ENT/MOUTH: NEGATIVE for ear, mouth and throat problems  RESP: NEGATIVE for significant  "cough or SOB  BREAST: NEGATIVE for masses, tenderness or discharge  CV: NEGATIVE for chest pain, palpitations or peripheral edema  GI: NEGATIVE for nausea, abdominal pain, heartburn, or change in bowel habits  : NEGATIVE for frequency, dysuria, or hematuria  MUSCULOSKELETAL: NEGATIVE for significant arthralgias or myalgia  NEURO: NEGATIVE for weakness, dizziness or paresthesias  ENDOCRINE: NEGATIVE for temperature intolerance, skin/hair changes  HEME: NEGATIVE for bleeding problems  PSYCHIATRIC: NEGATIVE for changes in mood or affect      Objective    /80   Pulse 59   Temp 97.8  F (36.6  C) (Tympanic)   Resp 18   Ht 1.727 m (5' 8\")   Wt 87.1 kg (192 lb)   SpO2 97%   BMI 29.19 kg/m    Body mass index is 29.19 kg/m .  Physical Exam   GENERAL: healthy, alert and no distress  EYES: Eyes grossly normal to inspection, PERRL and conjunctivae and sclerae normal  RESP: lungs clear to auscultation - no rales, rhonchi or wheezes  CV: regular rate and rhythm, normal S1 S2, no S3 or S4, no murmur, click or rub, no peripheral edema and peripheral pulses strong  MS: no gross musculoskeletal defects noted, no edema POS 15 cm hanging lipomastous structure of inner Rt upper arm   SKIN: no suspicious lesions or rashes  NEURO: Normal strength and tone, mentation intact and speech normal  PSYCH: mentation appears normal, affect normal/bright    Diagnostic Test Results:  Labs reviewed in Epic  Results for orders placed or performed in visit on 07/25/19   Prostate spec antigen screen   Result Value Ref Range    PSA 1.28 0 - 4 ug/L   Hemoglobin A1c   Result Value Ref Range    Hemoglobin A1C 5.0 0 - 5.6 %           Assessment & Plan       ICD-10-CM    1. Lipoma of skin and subcutaneous tissue-large of Rt post upper dominant arm  D17.30 GENERAL SURG ADULT REFERRAL   2. Impaired fasting glucose R73.01 Hemoglobin A1c   3. Benign essential hypertension-untreated as pt refuses  I10    4. Screening for prostate cancer Z12.5 " "Prostate spec antigen screen   5. Overweight (BMI 25.0-29.9) E66.3         BMI:   Estimated body mass index is 29.19 kg/m  as calculated from the following:    Height as of this encounter: 1.727 m (5' 8\").    Weight as of this encounter: 87.1 kg (192 lb).   Weight management plan: Discussed healthy diet and exercise guidelines        Patient Instructions   1.  Weight Loss Tips  1. Do not eat after 6 hrs before your expected bedtime  2. Have your heaviest meal for breakfast, a slightly lighter meal at lunch and a snack 6 hrs before bed  3. No sugar/calorie drinks except milk ie no fruit juice, pop, alcohol.  4. Drink milk 30min before meals to decrease your hunger. Also it is excellent as part of your last meal of the day snack  5. Drink lots of water  6. Increase fiber in diet: all bran cereal, salads, popcorn etc  7. Have only one small serving of fruit a day about 1/2 cup (as this is high in sugar)  8. EXERCISE is the bottom line. Without it, you will gain weight even on a low calorie diet. Best if done 2-3X a day as can    Being overweight contributes to high blood pressure and high cholesterol, both of which cause heart attacks, strokes and kidney failure, prediabetes and diabetes, arthritis, and liver disease     2. Did tung ocampo of the risk of  Ongoing untreated HTN       Return in about 1 week (around 8/1/2019) for BP Recheck.    Yadira Moulton MD  [facility]        "

## 2019-07-25 NOTE — PATIENT INSTRUCTIONS
1.  Weight Loss Tips  1. Do not eat after 6 hrs before your expected bedtime  2. Have your heaviest meal for breakfast, a slightly lighter meal at lunch and a snack 6 hrs before bed  3. No sugar/calorie drinks except milk ie no fruit juice, pop, alcohol.  4. Drink milk 30min before meals to decrease your hunger. Also it is excellent as part of your last meal of the day snack  5. Drink lots of water  6. Increase fiber in diet: all bran cereal, salads, popcorn etc  7. Have only one small serving of fruit a day about 1/2 cup (as this is high in sugar)  8. EXERCISE is the bottom line. Without it, you will gain weight even on a low calorie diet. Best if done 2-3X a day as can    Being overweight contributes to high blood pressure and high cholesterol, both of which cause heart attacks, strokes and kidney failure, prediabetes and diabetes, arthritis, and liver disease     2. Did tung pt of the risk of  Ongoing untreated HTN

## 2019-07-26 PROBLEM — I10 BENIGN ESSENTIAL HYPERTENSION: Status: ACTIVE | Noted: 2019-07-26

## 2019-07-26 PROBLEM — E66.3 OVERWEIGHT (BMI 25.0-29.9): Status: ACTIVE | Noted: 2019-07-26

## 2019-07-26 LAB — PSA SERPL-ACNC: 1.28 UG/L (ref 0–4)

## 2019-08-07 ENCOUNTER — OFFICE VISIT (OUTPATIENT)
Dept: SURGERY | Facility: CLINIC | Age: 54
End: 2019-08-07
Payer: COMMERCIAL

## 2019-08-07 VITALS
WEIGHT: 192 LBS | HEIGHT: 68 IN | DIASTOLIC BLOOD PRESSURE: 80 MMHG | HEART RATE: 59 BPM | SYSTOLIC BLOOD PRESSURE: 120 MMHG | BODY MASS INDEX: 29.1 KG/M2

## 2019-08-07 DIAGNOSIS — R22.31 ARM MASS, RIGHT: Primary | ICD-10-CM

## 2019-08-07 PROCEDURE — 99243 OFF/OP CNSLTJ NEW/EST LOW 30: CPT | Performed by: SURGERY

## 2019-08-07 ASSESSMENT — ENCOUNTER SYMPTOMS
NECK PAIN: 0
FALLS: 0
WEIGHT LOSS: 0
FEVER: 0
CHILLS: 0
BACK PAIN: 0

## 2019-08-07 ASSESSMENT — MIFFLIN-ST. JEOR: SCORE: 1675.41

## 2019-08-07 NOTE — PROGRESS NOTES
HPI we are asked by Dr. Moulton to see Mr. Richards concerning a large right upper arm mass.  He became aware of this over the last 3 to 4 years.  It may be growing slightly.  It is uncomfortable.  It is not draining or red.  He has not had cancer at all.  He has had no trauma to his arm.  He lifts some at work.      Review of Systems   Constitutional: Negative for chills, fever and weight loss.   Cardiovascular: Negative for chest pain.   Musculoskeletal: Negative for back pain, falls, joint pain and neck pain.   All other systems reviewed and are negative.        Physical Exam   Constitutional: He appears well-developed and well-nourished. No distress.   HENT:   Head: Normocephalic and atraumatic.   Nose: Nose normal.   Eyes: Conjunctivae are normal. No scleral icterus.   Neck: No thyromegaly present.   Cardiovascular: Intact distal pulses.   Pulmonary/Chest: Effort normal. No respiratory distress. He exhibits no tenderness.   Musculoskeletal: He exhibits no edema or tenderness.        Right upper arm: He exhibits swelling and deformity. He exhibits no tenderness, no bony tenderness and no edema.   10x6 cm soft mass posterior on right upper arm. No redness, no drainage, not tender   Lymphadenopathy:     He has no cervical adenopathy.     He has no axillary adenopathy.        Right axillary: No pectoral and no lateral adenopathy present.        Left axillary: No pectoral and no lateral adenopathy present.       Right: No supraclavicular adenopathy present.   Neurological: He has normal strength.   No numbness or weakness right arm.    Skin: He is not diaphoretic.   Nursing note and vitals reviewed.    Imp: He has a large subcutaneous mass of the right upper arm.  This is probably fatty.  It could represent node or cyst but lipoma is much more likely.  I do not think this would be removed well with liposuction.  Excisional removal would be best.  We could send this to pathology for specimen.  Risks of bleeding,  infection, anesthesia, nerve injury, numbness or weakness, seroma formation all reviewed in detail.  He is interested in having this removed in the future.  He will call us when he is ready for this.  Copy to PCP

## 2019-08-09 ENCOUNTER — TELEPHONE (OUTPATIENT)
Dept: SURGERY | Facility: CLINIC | Age: 54
End: 2019-08-09

## 2019-08-09 NOTE — TELEPHONE ENCOUNTER
Type of surgery: excision right upper arm mass  Location of surgery: [facility]  Date and time of surgery: 8/15/19 7:30am  Surgeon: Dr Sexton  Pre-Op Appt Date: pt to schedule  Post-Op Appt Date: pt to schedule   Packet sent out: Yes  Pre-cert/Authorization completed:  Not Applicable  Date: 8/7/19    MAC anesthesia    Pt instructed to stop ASA 4 days prior to surgery

## 2019-08-13 ENCOUNTER — OFFICE VISIT (OUTPATIENT)
Dept: FAMILY MEDICINE | Facility: CLINIC | Age: 54
End: 2019-08-13
Payer: COMMERCIAL

## 2019-08-13 VITALS
RESPIRATION RATE: 18 BRPM | WEIGHT: 192 LBS | HEIGHT: 68 IN | TEMPERATURE: 97.7 F | HEART RATE: 76 BPM | BODY MASS INDEX: 29.1 KG/M2 | OXYGEN SATURATION: 98 % | SYSTOLIC BLOOD PRESSURE: 126 MMHG | DIASTOLIC BLOOD PRESSURE: 80 MMHG

## 2019-08-13 DIAGNOSIS — R73.01 IMPAIRED FASTING GLUCOSE: ICD-10-CM

## 2019-08-13 DIAGNOSIS — Z01.818 PREOP GENERAL PHYSICAL EXAM: Primary | ICD-10-CM

## 2019-08-13 DIAGNOSIS — R03.0 ELEVATED BLOOD PRESSURE READING WITHOUT DIAGNOSIS OF HYPERTENSION: ICD-10-CM

## 2019-08-13 DIAGNOSIS — Z11.59 ENCOUNTER FOR HEPATITIS C SCREENING TEST FOR LOW RISK PATIENT: ICD-10-CM

## 2019-08-13 DIAGNOSIS — E66.3 OVERWEIGHT (BMI 25.0-29.9): ICD-10-CM

## 2019-08-13 DIAGNOSIS — D17.30 LIPOMA OF SKIN AND SUBCUTANEOUS TISSUE: ICD-10-CM

## 2019-08-13 LAB
BASOPHILS # BLD AUTO: 0 10E9/L (ref 0–0.2)
BASOPHILS NFR BLD AUTO: 0.4 %
DIFFERENTIAL METHOD BLD: NORMAL
EOSINOPHIL # BLD AUTO: 0.2 10E9/L (ref 0–0.7)
EOSINOPHIL NFR BLD AUTO: 2.3 %
ERYTHROCYTE [DISTWIDTH] IN BLOOD BY AUTOMATED COUNT: 13.6 % (ref 10–15)
HCT VFR BLD AUTO: 47.4 % (ref 40–53)
HGB BLD-MCNC: 16.6 G/DL (ref 13.3–17.7)
LYMPHOCYTES # BLD AUTO: 1.9 10E9/L (ref 0.8–5.3)
LYMPHOCYTES NFR BLD AUTO: 23.1 %
MCH RBC QN AUTO: 30.7 PG (ref 26.5–33)
MCHC RBC AUTO-ENTMCNC: 35 G/DL (ref 31.5–36.5)
MCV RBC AUTO: 88 FL (ref 78–100)
MONOCYTES # BLD AUTO: 0.7 10E9/L (ref 0–1.3)
MONOCYTES NFR BLD AUTO: 8.4 %
NEUTROPHILS # BLD AUTO: 5.3 10E9/L (ref 1.6–8.3)
NEUTROPHILS NFR BLD AUTO: 65.8 %
PLATELET # BLD AUTO: 176 10E9/L (ref 150–450)
RBC # BLD AUTO: 5.4 10E12/L (ref 4.4–5.9)
WBC # BLD AUTO: 8.1 10E9/L (ref 4–11)

## 2019-08-13 PROCEDURE — 36415 COLL VENOUS BLD VENIPUNCTURE: CPT | Performed by: FAMILY MEDICINE

## 2019-08-13 PROCEDURE — 82947 ASSAY GLUCOSE BLOOD QUANT: CPT | Performed by: FAMILY MEDICINE

## 2019-08-13 PROCEDURE — 86803 HEPATITIS C AB TEST: CPT | Performed by: FAMILY MEDICINE

## 2019-08-13 PROCEDURE — 99214 OFFICE O/P EST MOD 30 MIN: CPT | Performed by: FAMILY MEDICINE

## 2019-08-13 PROCEDURE — 85025 COMPLETE CBC W/AUTO DIFF WBC: CPT | Performed by: FAMILY MEDICINE

## 2019-08-13 ASSESSMENT — MIFFLIN-ST. JEOR: SCORE: 1675.41

## 2019-08-13 NOTE — PATIENT INSTRUCTIONS
Before Your Surgery      Call your surgeon if there is any change in your health. This includes signs of a cold or flu (such as a sore throat, runny nose, cough, rash or fever).    Do not smoke, drink alcohol or take over the counter medicine (unless your surgeon or primary care doctor tells you to) for the 24 hours before and after surgery.    If you take prescribed drugs: Follow your doctor s orders about which medicines to take and which to stop until after surgery.    Eating and drinking prior to surgery: follow the instructions from your surgeon    Take a shower or bath the night before surgery. Use the soap your surgeon gave you to gently clean your skin. If you do not have soap from your surgeon, use your regular soap. Do not shave or scrub the surgery site.  Wear clean pajamas and have clean sheets on your bed.     1. Please stop fish oil,  aspirin, any NSAIDs ( incuding aleve advil, ibuprofen, etc--use tylenol= acetaminophen instead)  vitamin D, and multivitamins for 7 days prior to and 7 days after surgery     2.  Weight Loss Tips  1. Do not eat after 6 hrs before your expected bedtime  2. Have your heaviest meal for breakfast, a slightly lighter meal at lunch and a snack 6 hrs before bed  3. No sugar/calorie drinks except milk ie no fruit juice, pop, alcohol.  4. Drink milk 30min before meals to decrease your hunger. Also it is excellent as part of your last meal of the day snack  5. Drink lots of water  6. Increase fiber in diet: all bran cereal, salads, popcorn etc  7. Have only one small serving of fruit a day about 1/2 cup (as this is high in sugar)  8. EXERCISE is the bottom line. Without it, you will gain weight even on a low calorie diet. Best if done 2-3X a day as can    Being overweight contributes to high blood pressure and high cholesterol, both of which cause heart attacks, strokes and kidney failure, prediabetes and diabetes, arthritis, and liver disease     4. Shingrex is a 2 shot series  that prevents shingles 97% of the time, as opposed to the old shingles shot that only prevented it at 40-50%  It costs less for medicare at a pharmacy  You should get it starting at 50 yrs old get the 2nd shot 5-6 mo after the first one      Patient Education     Shingles (Herpes Zoster)     Talk to your healthcare provider about the shingles vaccine.     Shingles is also called herpes zoster. It is a painful skin rash caused by the herpes zoster virus. This is the same virus that causes chickenpox. After a person has chickenpox, the virus remains inactive in the nerve cells. Years later, the virus can become active again and travel to the skin. Most people have shingles only once, but it is possible to have it more than once.  What are the risk factors for shingles?  Anyone who has ever had chickenpox can develop shingles. But your risk is greater if you:    Are 50 years of age or older    Have an illness that weakens your immune system, such as HIV/AIDS    Have cancer, especially Hodgkin disease or lymphoma    Take medicines that weaken your immune system  What are the symptoms of shingles?    The first sign of shingles is usually pain, burning, tingling, or itching on one part of your face or body. You may also feel as if you have the flu, with fever and chills.    A red rash with small blisters appears within a few days. The rash may appear as follows:   ? The blisters can occur anywhere, but they re most common on the back, chest, or abdomen.  ? They usually appear on only one side of the body, spreading along the nerve pathway where the virus was inactive.   ? The rash can also form around an eye, along one side of the face or neck, or in the mouth.  ? In a few people, usually those with weakened immune systems, shingles appear on more than one part of the body at once.    After a few days, the blisters become dry and form a crust. The crust falls off in days to weeks. The blisters generally do not leave  scars.  How is shingles treated?  For most people, shingles heals on its own in a few weeks. But treatment is recommended to help relieve pain, speed healing, and reduce the risk of complications. Antiviral medicines are prescribed within the first 72 hours of the appearance of the rash. To lessen symptoms:    Apply ice packs (wrapped in a thin towel) or cool compresses, or soak in a cool bath.    Use calamine lotion to calm itchy skin.    Ask your healthcare provider about over-the-counter pain relievers. If your pain is severe, your healthcare provider may prescribe stronger pain medicines.  What are the complications of shingles?  Shingles often goes away with no lasting effects. But some people have serious problems long after the blisters have healed:    Postherpetic neuralgia. This is the most common complication. It is severe nerve pain at the place where the rash used to be. It can last for months, or even years after you have had shingles. Medicines can be prescribed to help relieve the pain and improve quality of life.    Bacterial infection. Shingles blisters may become infected with bacteria. Antibiotic medicine is used to treat the infection.    Eye problems. A person with shingles on the face should see his or her healthcare provider right away. Shingles can cause serious problems with vision, and even blindness.  Very rarely shingles can also lead to pneumonia, hearing problems, brain inflammation, or even death.   When to seek medical care  Contact your healthcare provider if you experience any of the following:    Symptoms that don t go away with treatment    A rash or blisters near your eye    Increased drainage, fever, or rash after treatment, or severe pain that doesn t go away   How can shingles be prevented?  You can only get shingles if you have had chickenpox in the past. Those who have never had chickenpox can get the virus from you. Although instead of developing shingles, the person may  get chickenpox. Until your blisters form scabs, avoid contact with others, especially the following:    Pregnant women who have never had chickenpox or the vaccine    Infants who were born early (prematurely) or who had low weight at birth    People with weak immune system (for example, people receiving chemotherapy for cancer, people who have had organ transplants, or people with HIV infections)     The shingles vaccine  Shingles vaccines are available to help prevent shingles or make it less painful. Vaccination is recommended for adults 50 and older, even if you've had shingles in the past. Talk with your healthcare provider about the most appropriate time for you to get vaccinated, and which vaccine is best for you.   Date Last Reviewed: 10/1/2016    3524-0578 [employer]. [address] [place] [address], [place] [address]. All rights reserved. This information is not intended as a substitute for professional medical care. Always follow your healthcare professional's instructions.

## 2019-08-13 NOTE — NURSING NOTE
"Chief Complaint   Patient presents with     Pre-Op Exam     /80   Pulse 76   Temp 97.7  F (36.5  C) (Tympanic)   Resp 18   Ht 1.727 m (5' 8\")   Wt 87.1 kg (192 lb)   SpO2 98%   BMI 29.19 kg/m   Estimated body mass index is 29.19 kg/m  as calculated from the following:    Height as of this encounter: 1.727 m (5' 8\").    Weight as of this encounter: 87.1 kg (192 lb).  BP completed using cuff size: lencho Panchal CMA    Health Maintenance Due   Topic Date Due     HEPATITIS C SCREENING  1965     ADVANCE CARE PLANNING  1965     COLONOSCOPY  06/10/1973     HIV SCREENING  06/10/1978     ZOSTER IMMUNIZATION (1 of 2) 06/10/2013     PREVENTIVE CARE VISIT  09/05/2014     Health Maintenance reviewed at today's visit patient asked to schedule/complete:   Routine Health Visit: Patient agrees to schedule  Colon Cancer:  Patient agrees to schedule  Immunizations:  Patient agrees to schedule    "

## 2019-08-13 NOTE — PROGRESS NOTES
[facility]  [address]  [facility] [address]  [phone]  Dept: [phone]    PRE-OP EVALUATION:  Today's date: 2019    Cherry Richards (: 1965) presents for pre-operative evaluation assessment as requested by Dr. Sexton.  He requires evaluation and anesthesia risk assessment prior to undergoing surgery/procedure for treatment of Lipoma Removal .    Proposed Surgery/ Procedure: Lipoma Removal  Date of Surgery/ Procedure: 08/15/2019  Time of Surgery/ Procedure: 730 am  Hospital/Surgical Facility: [facility]  Fax number for surgical facility: [phone]  Primary Physician: Yadira Moulton  Type of Anesthesia Anticipated: to be determined    Patient has a Health Care Directive or Living Will:  NO    1. NO - Do you have a history of heart attack, stroke, stent, bypass or surgery on an artery in the head, neck, heart or legs?  2. NO - Do you ever have any pain or discomfort in your chest?  3. NO - Do you have a history of  Heart Failure?  4. NO - Are you troubled by shortness of breath when: walking on the level, up a slight hill or at night?  5. NO - Do you currently have a cold, bronchitis or other respiratory infection?  6. NO - Do you have a cough, shortness of breath or wheezing?  7. NO - Do you sometimes get pains in the calves of your legs when you walk?  8. NO - Do you or anyone in your family have previous history of blood clots?  9. NO - Do you or does anyone in your family have a serious bleeding problem such as prolonged bleeding following surgeries or cuts?  10. NO - Have you ever had problems with anemia or been told to take iron pills?  11. NO - Have you had any abnormal blood loss such as black, tarry or bloody stools, or abnormal vaginal bleeding?  12. NO - Have you ever had a blood transfusion?  13. NO - Have you or any of your relatives ever had problems with anesthesia?  14. NO - Do you have sleep apnea, excessive  snoring or daytime drowsiness?  15. NO - Do you have any prosthetic heart valves?  16. NO - Do you have prosthetic joints?  17. NO - Is there any chance that you may be pregnant?      HPI:     HPI related to upcoming procedure: Concern - lipoma of Rt post upper arm   Onset: 5yrsago     Description:   Above     Intensity: moderate    Progression of Symptoms:  worsening    Accompanying Signs & Symptoms:  --does rub on body     Previous history of similar problem:   no    Precipitating factors:   Worsened by: 0    Alleviating factors:  Improved by: 0    Therapies Tried and outcome: 0         See problem list for active medical problems.  Problems all longstanding and stable, except as noted/documented.  See ROS for pertinent symptoms related to these conditions.      MEDICAL HISTORY:     Patient Active Problem List    Diagnosis Date Noted     Benign essential hypertension-untreated as pt refuses  07/26/2019     Priority: Medium     Overweight (BMI 25.0-29.9) 07/26/2019     Priority: Medium     Screening for prostate cancer 07/25/2019     Priority: Medium     Impaired fasting glucose 07/25/2019     Priority: Medium     Lipoma of skin and subcutaneous tissue-large of Rt post upper dominant arm  07/25/2019     Priority: Medium     S/P appendectomy, follow-up exam 10/03/2018     Priority: Medium     Gallstones 10/03/2018     Priority: Medium     Acute appendicitis 09/29/2018     Priority: Medium     CARDIOVASCULAR SCREENING; LDL GOAL LESS THAN 160 12/15/2015     Priority: Medium     BMI 30-35 12/04/2015     Priority: Medium     Leg weakness, bilateral since 2012 01/15/2015     Priority: Medium     Elevated blood pressure reading without diagnosis of hypertension 01/15/2015     Priority: Medium     Glucose intolerance (impaired glucose tolerance) 01/15/2015     Priority: Medium     Vitamin D deficiency 01/15/2015     Priority: Medium     Family history of diabetes mellitus 09/05/2013     Priority: Medium      History  "reviewed. No pertinent past medical history.  Past Surgical History:   Procedure Laterality Date     LAPAROSCOPIC APPENDECTOMY N/A 2018    Procedure: LAPAROSCOPIC APPENDECTOMY;  LAPAROSCOPIC APPENDECTOMY;  Surgeon: Kassy Thapa MD;  Location:  OR     LAPAROSCOPIC CHOLECYSTECTOMY N/A 2018    Procedure: LAPAROSCOPIC CHOLECYSTECTOMY;  Surgeon: Tae Westbrook MD;  Location:  OR     Current Outpatient Medications   Medication Sig Dispense Refill     ASPIRIN PO Take 325 mg by mouth daily as needed        OTC products: None, except as noted above    No Known Allergies   Latex Allergy: NO    Social History     Tobacco Use     Smoking status: Former Smoker     Packs/day: 0.50     Years: 6.00     Pack years: 3.00     Types: Cigarettes     Last attempt to quit: 1988     Years since quittin.8     Smokeless tobacco: Former User   Substance Use Topics     Alcohol use: No     Alcohol/week: 0.0 oz     History   Drug Use No       REVIEW OF SYSTEMS:   CONSTITUTIONAL: NEGATIVE for fever, chills, change in weight  INTEGUMENTARY/SKIN: NEGATIVE for worrisome rashes, moles or lesions POS large Rt arm lipoma   EYES: NEGATIVE for vision changes or irritation  ENT/MOUTH: NEGATIVE for ear, mouth and throat problems  RESP: NEGATIVE for significant cough or SOB  BREAST: NEGATIVE for masses, tenderness or discharge  CV: NEGATIVE for chest pain, palpitations or peripheral edema  GI: NEGATIVE for nausea, abdominal pain, heartburn, or change in bowel habits  : NEGATIVE for frequency, dysuria, or hematuria  MUSCULOSKELETAL: NEGATIVE for significant arthralgias or myalgia  NEURO: NEGATIVE for weakness, dizziness or paresthesias  ENDOCRINE: NEGATIVE for temperature intolerance, skin/hair changes  HEME: NEGATIVE for bleeding problems  PSYCHIATRIC: NEGATIVE for changes in mood or affect    EXAM:   /80   Pulse 76   Temp 97.7  F (36.5  C) (Tympanic)   Resp 18   Ht 1.727 m (5' 8\")   Wt 87.1 kg (192 lb)   " SpO2 98%   BMI 29.19 kg/m      GENERAL APPEARANCE: healthy, alert, active, no distress, cooperative and over weight     EYES: EOMI,  PERRL     NECK: no adenopathy, no asymmetry, masses, or scars and thyroid normal to palpation     RESP: lungs clear to auscultation - no rales, rhonchi or wheezes     CV: regular rates and rhythm, normal S1 S2, no S3 or S4 and no murmur, click or rub     ABDOMEN:  soft, nontender, no HSM or masses and bowel sounds normal     MS: extremities normal- no gross deformities noted, no evidence of inflammation in joints, FROM in all extremities.  POS large lipoma extendin g almost the entire length of the upper arm o the post med side, interfering with arm movement --rubs on the chest wall -extending from the surface of the arm by about 3 cm      SKIN: no suspicious lesions or rashes     NEURO: Normal strength and tone, sensory exam grossly normal, mentation intact and speech normal     PSYCH: mentation appears normal. and affect normal/bright     LYMPHATICS: No cervical adenopathy    DIAGNOSTICS:     Labs Drawn and in Process:   Unresulted Labs Ordered in the Past 30 Days of this Admission     Date and Time Order Name Status Description    8/13/2019 1518 GLUCOSE In process     8/13/2019 1518 CBC WITH PLATELETS DIFFERENTIAL In process     8/13/2019 1518 HEPATITIS C ANTIBODY In process           Recent Labs   Lab Test 07/25/19  0937 10/06/18  0403 09/29/18  0830   HGB  --  17.0 16.6   PLT  --  215 187   NA  --  140 139   POTASSIUM  --  3.8 3.8   CR  --  1.18 1.00   A1C 5.0  --   --         IMPRESSION:   Reason for surgery/procedure: large encumbering lipoma of Rt arm   Diagnosis/reason for consult: medical review       The proposed surgical procedure is considered LOW risk.    REVISED CARDIAC RISK INDEX  The patient has the following serious cardiovascular risks for perioperative complications such as (MI, PE, VFib and 3  AV Block):  No serious cardiac risks  INTERPRETATION: 0 risks: Class I  (very low risk - 0.4% complication rate)    The patient has the following additional risks for perioperative complications:  No identified additional risks      ICD-10-CM    1. Preop general physical exam Z01.818 CBC with platelets differential     Glucose   2. Lipoma of skin and subcutaneous tissue-large of Rt post upper dominant arm  D17.30 CBC with platelets differential     Glucose   3. Impaired fasting glucose R73.01 Glucose   4. Elevated blood pressure reading without diagnosis of hypertension R03.0    5. Overweight (BMI 25.0-29.9) E66.3 Glucose   6. Encounter for hepatitis C screening test for low risk patient Z11.59 Hepatitis C antibody       RECOMMENDATIONS:         --Patient is to take all scheduled medications on the day of surgery EXCEPT for modifications listed below.    APPROVAL GIVEN to proceed with proposed procedure, without further diagnostic evaluation    Patient Instructions       Before Your Surgery      Call your surgeon if there is any change in your health. This includes signs of a cold or flu (such as a sore throat, runny nose, cough, rash or fever).    Do not smoke, drink alcohol or take over the counter medicine (unless your surgeon or primary care doctor tells you to) for the 24 hours before and after surgery.    If you take prescribed drugs: Follow your doctor s orders about which medicines to take and which to stop until after surgery.    Eating and drinking prior to surgery: follow the instructions from your surgeon    Take a shower or bath the night before surgery. Use the soap your surgeon gave you to gently clean your skin. If you do not have soap from your surgeon, use your regular soap. Do not shave or scrub the surgery site.  Wear clean pajamas and have clean sheets on your bed.     1. Please stop fish oil,  aspirin, any NSAIDs ( incuding aleve advil, ibuprofen, etc--use tylenol= acetaminophen instead)  vitamin D, and multivitamins for 7 days prior to and 7 days after surgery      2.  Weight Loss Tips  1. Do not eat after 6 hrs before your expected bedtime  2. Have your heaviest meal for breakfast, a slightly lighter meal at lunch and a snack 6 hrs before bed  3. No sugar/calorie drinks except milk ie no fruit juice, pop, alcohol.  4. Drink milk 30min before meals to decrease your hunger. Also it is excellent as part of your last meal of the day snack  5. Drink lots of water  6. Increase fiber in diet: all bran cereal, salads, popcorn etc  7. Have only one small serving of fruit a day about 1/2 cup (as this is high in sugar)  8. EXERCISE is the bottom line. Without it, you will gain weight even on a low calorie diet. Best if done 2-3X a day as can    Being overweight contributes to high blood pressure and high cholesterol, both of which cause heart attacks, strokes and kidney failure, prediabetes and diabetes, arthritis, and liver disease     4. Shingrex is a 2 shot series that prevents shingles 97% of the time, as opposed to the old shingles shot that only prevented it at 40-50%  It costs less for medicare at a pharmacy  You should get it starting at 50 yrs old get the 2nd shot 5-6 mo after the first one      Patient Education     Shingles (Herpes Zoster)     Talk to your healthcare provider about the shingles vaccine.     Shingles is also called herpes zoster. It is a painful skin rash caused by the herpes zoster virus. This is the same virus that causes chickenpox. After a person has chickenpox, the virus remains inactive in the nerve cells. Years later, the virus can become active again and travel to the skin. Most people have shingles only once, but it is possible to have it more than once.  What are the risk factors for shingles?  Anyone who has ever had chickenpox can develop shingles. But your risk is greater if you:    Are 50 years of age or older    Have an illness that weakens your immune system, such as HIV/AIDS    Have cancer, especially Hodgkin disease or lymphoma    Take  medicines that weaken your immune system  What are the symptoms of shingles?    The first sign of shingles is usually pain, burning, tingling, or itching on one part of your face or body. You may also feel as if you have the flu, with fever and chills.    A red rash with small blisters appears within a few days. The rash may appear as follows:   ? The blisters can occur anywhere, but they re most common on the back, chest, or abdomen.  ? They usually appear on only one side of the body, spreading along the nerve pathway where the virus was inactive.   ? The rash can also form around an eye, along one side of the face or neck, or in the mouth.  ? In a few people, usually those with weakened immune systems, shingles appear on more than one part of the body at once.    After a few days, the blisters become dry and form a crust. The crust falls off in days to weeks. The blisters generally do not leave scars.  How is shingles treated?  For most people, shingles heals on its own in a few weeks. But treatment is recommended to help relieve pain, speed healing, and reduce the risk of complications. Antiviral medicines are prescribed within the first 72 hours of the appearance of the rash. To lessen symptoms:    Apply ice packs (wrapped in a thin towel) or cool compresses, or soak in a cool bath.    Use calamine lotion to calm itchy skin.    Ask your healthcare provider about over-the-counter pain relievers. If your pain is severe, your healthcare provider may prescribe stronger pain medicines.  What are the complications of shingles?  Shingles often goes away with no lasting effects. But some people have serious problems long after the blisters have healed:    Postherpetic neuralgia. This is the most common complication. It is severe nerve pain at the place where the rash used to be. It can last for months, or even years after you have had shingles. Medicines can be prescribed to help relieve the pain and improve quality of  life.    Bacterial infection. Shingles blisters may become infected with bacteria. Antibiotic medicine is used to treat the infection.    Eye problems. A person with shingles on the face should see his or her healthcare provider right away. Shingles can cause serious problems with vision, and even blindness.  Very rarely shingles can also lead to pneumonia, hearing problems, brain inflammation, or even death.   When to seek medical care  Contact your healthcare provider if you experience any of the following:    Symptoms that don t go away with treatment    A rash or blisters near your eye    Increased drainage, fever, or rash after treatment, or severe pain that doesn t go away   How can shingles be prevented?  You can only get shingles if you have had chickenpox in the past. Those who have never had chickenpox can get the virus from you. Although instead of developing shingles, the person may get chickenpox. Until your blisters form scabs, avoid contact with others, especially the following:    Pregnant women who have never had chickenpox or the vaccine    Infants who were born early (prematurely) or who had low weight at birth    People with weak immune system (for example, people receiving chemotherapy for cancer, people who have had organ transplants, or people with HIV infections)     The shingles vaccine  Shingles vaccines are available to help prevent shingles or make it less painful. Vaccination is recommended for adults 50 and older, even if you've had shingles in the past. Talk with your healthcare provider about the most appropriate time for you to get vaccinated, and which vaccine is best for you.   Date Last Reviewed: 10/1/2016    9319-6210 [employer]. [address] [place] [address], [place] [address]. All rights reserved. This information is not intended as a substitute for professional medical care. Always follow your healthcare professional's instructions.                    Signed  Electronically by: Yadira Moulton MD    Copy of this evaluation report is provided to requesting physician.    [facility] Preop Guidelines    Revised Cardiac Risk Index

## 2019-08-13 NOTE — LETTER
August 15, 2019      Cherry Richards  [address] [address]        Dear ,    We are writing to inform you of your test results.    They are all normal     THE FOLLOWING ARE EXPLANATIONS OF SOME OF OUR LAB TESTS     YOU DID NOT NECESSARILY HAVE ALL OF THESE DONE     Hgb is the blood iron level   WBC means White Blood Cells   Platelets are small blood    cells that help with forming the blood clots along with other blood factors.   Electrolytes are Sodium, Potassium, Calcium, Magnesium, Phosphorus.   Liver tests are: AST, ALT, Bilirubin, Alkaline Phosphatase.   Kidney tests are Creatinine, GFR.   HDL Choles   terol - is the good cholesterol and it is good to have it high.   LDL cholesterol is the bad cholesterol and it is good to have it low.   It is recommended to have LDL less than 130 for people with hypertension and to have it less than 100 for people with   heart disease, diabetes and chronic kidney disease.   Triglycerides are another type of lipid that can cause heart disease, like the cholesterol and should be kept low   Thyroid tests are TSH, T4, T3   Glucose is sugar.   A1c is a test that gives us an idea    about how well was controlled the diabetes for the last 3 months.   PSA stands for Prostate Specific Antigen and it can be elevated with prostate cancer or prostate inflammation.     You are negative for Hepatitis C       Please continue on the same medications     Hope your surgery goes well and you are back to work quickly    Resulted Orders   Hepatitis C antibody   Result Value Ref Range    Hepatitis C Antibody Nonreactive NR^Nonreactive      Comment:      Assay performance characteristics have not been established for newborns,   infants, and children     CBC with platelets differential   Result Value Ref Range    WBC 8.1 4.0 - 11.0 10e9/L    RBC Count 5.40 4.4 - 5.9 10e12/L    Hemoglobin 16.6 13.3 - 17.7 g/dL    Hematocrit 47.4 40.0 - 53.0 %    MCV 88 78 - 100 fl    MCH  30.7 26.5 - 33.0 pg    MCHC 35.0 31.5 - 36.5 g/dL    RDW 13.6 10.0 - 15.0 %    Platelet Count 176 150 - 450 10e9/L    Diff Method Automated Method     % Neutrophils 65.8 %    % Lymphocytes 23.1 %    % Monocytes 8.4 %    % Eosinophils 2.3 %    % Basophils 0.4 %    Absolute Neutrophil 5.3 1.6 - 8.3 10e9/L    Absolute Lymphocytes 1.9 0.8 - 5.3 10e9/L    Absolute Monocytes 0.7 0.0 - 1.3 10e9/L    Absolute Eosinophils 0.2 0.0 - 0.7 10e9/L    Absolute Basophils 0.0 0.0 - 0.2 10e9/L   Glucose   Result Value Ref Range    Glucose 87 70 - 99 mg/dL       If you have any questions or concerns, please call the clinic at the number listed above.       Sincerely,        Yadira Moulton MD

## 2019-08-14 LAB — GLUCOSE SERPL-MCNC: 87 MG/DL (ref 70–99)

## 2019-08-15 ENCOUNTER — HOSPITAL ENCOUNTER (OUTPATIENT)
Facility: CLINIC | Age: 54
Discharge: HOME | End: 2019-08-15
Attending: SURGERY | Admitting: SURGERY
Payer: COMMERCIAL

## 2019-08-15 ENCOUNTER — ANESTHESIA EVENT (OUTPATIENT)
Dept: SURGERY | Facility: CLINIC | Age: 54
End: 2019-08-15
Payer: COMMERCIAL

## 2019-08-15 ENCOUNTER — APPOINTMENT (OUTPATIENT)
Dept: SURGERY | Facility: PHYSICIAN GROUP | Age: 54
End: 2019-08-15
Payer: COMMERCIAL

## 2019-08-15 ENCOUNTER — ANESTHESIA (OUTPATIENT)
Dept: SURGERY | Facility: CLINIC | Age: 54
End: 2019-08-15
Payer: COMMERCIAL

## 2019-08-15 VITALS
DIASTOLIC BLOOD PRESSURE: 82 MMHG | OXYGEN SATURATION: 97 % | TEMPERATURE: 97.7 F | WEIGHT: 191.4 LBS | BODY MASS INDEX: 29.01 KG/M2 | HEART RATE: 52 BPM | RESPIRATION RATE: 26 BRPM | SYSTOLIC BLOOD PRESSURE: 129 MMHG | HEIGHT: 68 IN

## 2019-08-15 DIAGNOSIS — G89.18 POST-OP PAIN: Primary | ICD-10-CM

## 2019-08-15 LAB — HCV AB SERPL QL IA: NONREACTIVE

## 2019-08-15 PROCEDURE — 24071 EXC ARM/ELBOW LES SC 3 CM/>: CPT | Performed by: SURGERY

## 2019-08-15 PROCEDURE — 88305 TISSUE EXAM BY PATHOLOGIST: CPT | Performed by: SURGERY

## 2019-08-15 PROCEDURE — 25000125 ZZHC RX 250: Performed by: NURSE ANESTHETIST, CERTIFIED REGISTERED

## 2019-08-15 PROCEDURE — 40000170 ZZH STATISTIC PRE-PROCEDURE ASSESSMENT II: Performed by: SURGERY

## 2019-08-15 PROCEDURE — 25800030 ZZH RX IP 258 OP 636: Performed by: NURSE ANESTHETIST, CERTIFIED REGISTERED

## 2019-08-15 PROCEDURE — 36000052 ZZH SURGERY LEVEL 2 EA 15 ADDTL MIN: Performed by: SURGERY

## 2019-08-15 PROCEDURE — 88305 TISSUE EXAM BY PATHOLOGIST: CPT | Mod: 26 | Performed by: SURGERY

## 2019-08-15 PROCEDURE — 36000050 ZZH SURGERY LEVEL 2 1ST 30 MIN: Performed by: SURGERY

## 2019-08-15 PROCEDURE — 25000128 H RX IP 250 OP 636: Performed by: NURSE ANESTHETIST, CERTIFIED REGISTERED

## 2019-08-15 PROCEDURE — 71000027 ZZH RECOVERY PHASE 2 EACH 15 MINS: Performed by: SURGERY

## 2019-08-15 PROCEDURE — 37000009 ZZH ANESTHESIA TECHNICAL FEE, EACH ADDTL 15 MIN: Performed by: SURGERY

## 2019-08-15 PROCEDURE — 25000128 H RX IP 250 OP 636: Performed by: ANESTHESIOLOGY

## 2019-08-15 PROCEDURE — 37000008 ZZH ANESTHESIA TECHNICAL FEE, 1ST 30 MIN: Performed by: SURGERY

## 2019-08-15 PROCEDURE — 25000125 ZZHC RX 250: Performed by: SURGERY

## 2019-08-15 PROCEDURE — 71000012 ZZH RECOVERY PHASE 1 LEVEL 1 FIRST HR: Performed by: SURGERY

## 2019-08-15 PROCEDURE — 71000013 ZZH RECOVERY PHASE 1 LEVEL 1 EA ADDTL HR: Performed by: SURGERY

## 2019-08-15 PROCEDURE — 27210794 ZZH OR GENERAL SUPPLY STERILE: Performed by: SURGERY

## 2019-08-15 RX ORDER — LIDOCAINE HYDROCHLORIDE 10 MG/ML
INJECTION, SOLUTION EPIDURAL; INFILTRATION; INTRACAUDAL; PERINEURAL
Status: DISCONTINUED
Start: 2019-08-15 | End: 2019-08-15 | Stop reason: HOSPADM

## 2019-08-15 RX ORDER — HYDROCODONE BITARTRATE AND ACETAMINOPHEN 5; 325 MG/1; MG/1
1 TABLET ORAL
Status: DISCONTINUED | OUTPATIENT
Start: 2019-08-15 | End: 2019-08-15 | Stop reason: HOSPADM

## 2019-08-15 RX ORDER — SODIUM CHLORIDE, SODIUM LACTATE, POTASSIUM CHLORIDE, CALCIUM CHLORIDE 600; 310; 30; 20 MG/100ML; MG/100ML; MG/100ML; MG/100ML
INJECTION, SOLUTION INTRAVENOUS CONTINUOUS PRN
Status: DISCONTINUED | OUTPATIENT
Start: 2019-08-15 | End: 2019-08-15

## 2019-08-15 RX ORDER — KETOROLAC TROMETHAMINE 30 MG/ML
30 INJECTION, SOLUTION INTRAMUSCULAR; INTRAVENOUS EVERY 6 HOURS PRN
Status: DISCONTINUED | OUTPATIENT
Start: 2019-08-15 | End: 2019-08-15 | Stop reason: HOSPADM

## 2019-08-15 RX ORDER — HYDROCODONE BITARTRATE AND ACETAMINOPHEN 5; 325 MG/1; MG/1
1-2 TABLET ORAL EVERY 4 HOURS PRN
Qty: 10 TABLET | Refills: 0 | Status: SHIPPED | OUTPATIENT
Start: 2019-08-15 | End: 2022-08-03

## 2019-08-15 RX ORDER — ONDANSETRON 2 MG/ML
4 INJECTION INTRAMUSCULAR; INTRAVENOUS EVERY 30 MIN PRN
Status: DISCONTINUED | OUTPATIENT
Start: 2019-08-15 | End: 2019-08-15 | Stop reason: HOSPADM

## 2019-08-15 RX ORDER — FENTANYL CITRATE 50 UG/ML
INJECTION, SOLUTION INTRAMUSCULAR; INTRAVENOUS PRN
Status: DISCONTINUED | OUTPATIENT
Start: 2019-08-15 | End: 2019-08-15

## 2019-08-15 RX ORDER — HYDROMORPHONE HYDROCHLORIDE 1 MG/ML
.3-.5 INJECTION, SOLUTION INTRAMUSCULAR; INTRAVENOUS; SUBCUTANEOUS EVERY 10 MIN PRN
Status: DISCONTINUED | OUTPATIENT
Start: 2019-08-15 | End: 2019-08-15 | Stop reason: HOSPADM

## 2019-08-15 RX ORDER — MEPERIDINE HYDROCHLORIDE 25 MG/ML
12.5 INJECTION INTRAMUSCULAR; INTRAVENOUS; SUBCUTANEOUS
Status: DISCONTINUED | OUTPATIENT
Start: 2019-08-15 | End: 2019-08-15 | Stop reason: HOSPADM

## 2019-08-15 RX ORDER — ONDANSETRON 2 MG/ML
INJECTION INTRAMUSCULAR; INTRAVENOUS PRN
Status: DISCONTINUED | OUTPATIENT
Start: 2019-08-15 | End: 2019-08-15

## 2019-08-15 RX ORDER — ALBUTEROL SULFATE 0.83 MG/ML
2.5 SOLUTION RESPIRATORY (INHALATION)
Status: DISCONTINUED | OUTPATIENT
Start: 2019-08-15 | End: 2019-08-15 | Stop reason: HOSPADM

## 2019-08-15 RX ORDER — NALOXONE HYDROCHLORIDE 0.4 MG/ML
.1-.4 INJECTION, SOLUTION INTRAMUSCULAR; INTRAVENOUS; SUBCUTANEOUS
Status: DISCONTINUED | OUTPATIENT
Start: 2019-08-15 | End: 2019-08-15 | Stop reason: HOSPADM

## 2019-08-15 RX ORDER — LORAZEPAM 2 MG/ML
.5-1 INJECTION INTRAMUSCULAR
Status: DISCONTINUED | OUTPATIENT
Start: 2019-08-15 | End: 2019-08-15 | Stop reason: HOSPADM

## 2019-08-15 RX ORDER — AMOXICILLIN 250 MG
1-2 CAPSULE ORAL 2 TIMES DAILY
Qty: 10 TABLET | Refills: 0 | Status: SHIPPED | OUTPATIENT
Start: 2019-08-15 | End: 2019-08-20

## 2019-08-15 RX ORDER — FENTANYL CITRATE 50 UG/ML
25-50 INJECTION, SOLUTION INTRAMUSCULAR; INTRAVENOUS EVERY 5 MIN PRN
Status: DISCONTINUED | OUTPATIENT
Start: 2019-08-15 | End: 2019-08-15 | Stop reason: HOSPADM

## 2019-08-15 RX ORDER — PROPOFOL 10 MG/ML
INJECTION, EMULSION INTRAVENOUS CONTINUOUS PRN
Status: DISCONTINUED | OUTPATIENT
Start: 2019-08-15 | End: 2019-08-15

## 2019-08-15 RX ORDER — SODIUM CHLORIDE, SODIUM LACTATE, POTASSIUM CHLORIDE, CALCIUM CHLORIDE 600; 310; 30; 20 MG/100ML; MG/100ML; MG/100ML; MG/100ML
INJECTION, SOLUTION INTRAVENOUS CONTINUOUS
Status: DISCONTINUED | OUTPATIENT
Start: 2019-08-15 | End: 2019-08-15 | Stop reason: HOSPADM

## 2019-08-15 RX ORDER — DEXAMETHASONE SODIUM PHOSPHATE 4 MG/ML
4 INJECTION, SOLUTION INTRA-ARTICULAR; INTRALESIONAL; INTRAMUSCULAR; INTRAVENOUS; SOFT TISSUE EVERY 10 MIN PRN
Status: DISCONTINUED | OUTPATIENT
Start: 2019-08-15 | End: 2019-08-15 | Stop reason: HOSPADM

## 2019-08-15 RX ORDER — MAGNESIUM HYDROXIDE 1200 MG/15ML
LIQUID ORAL PRN
Status: DISCONTINUED | OUTPATIENT
Start: 2019-08-15 | End: 2019-08-15 | Stop reason: HOSPADM

## 2019-08-15 RX ORDER — LIDOCAINE HYDROCHLORIDE 20 MG/ML
INJECTION, SOLUTION INFILTRATION; PERINEURAL PRN
Status: DISCONTINUED | OUTPATIENT
Start: 2019-08-15 | End: 2019-08-15

## 2019-08-15 RX ORDER — DIMENHYDRINATE 50 MG/ML
12.5 INJECTION, SOLUTION INTRAMUSCULAR; INTRAVENOUS
Status: DISCONTINUED | OUTPATIENT
Start: 2019-08-15 | End: 2019-08-15 | Stop reason: HOSPADM

## 2019-08-15 RX ORDER — BUPIVACAINE HYDROCHLORIDE AND EPINEPHRINE 5; 5 MG/ML; UG/ML
INJECTION, SOLUTION EPIDURAL; INTRACAUDAL; PERINEURAL
Status: DISCONTINUED
Start: 2019-08-15 | End: 2019-08-15 | Stop reason: HOSPADM

## 2019-08-15 RX ORDER — ONDANSETRON 4 MG/1
4 TABLET, ORALLY DISINTEGRATING ORAL EVERY 30 MIN PRN
Status: DISCONTINUED | OUTPATIENT
Start: 2019-08-15 | End: 2019-08-15 | Stop reason: HOSPADM

## 2019-08-15 RX ADMIN — LIDOCAINE HYDROCHLORIDE 40 MG: 20 INJECTION, SOLUTION INFILTRATION; PERINEURAL at 07:24

## 2019-08-15 RX ADMIN — KETOROLAC TROMETHAMINE 30 MG: 30 INJECTION, SOLUTION INTRAMUSCULAR at 08:53

## 2019-08-15 RX ADMIN — DEXMEDETOMIDINE HYDROCHLORIDE 4 MCG: 100 INJECTION, SOLUTION INTRAVENOUS at 07:40

## 2019-08-15 RX ADMIN — FENTANYL CITRATE 50 MCG: 50 INJECTION, SOLUTION INTRAMUSCULAR; INTRAVENOUS at 07:24

## 2019-08-15 RX ADMIN — DEXMEDETOMIDINE HYDROCHLORIDE 8 MCG: 100 INJECTION, SOLUTION INTRAVENOUS at 07:27

## 2019-08-15 RX ADMIN — DEXMEDETOMIDINE HYDROCHLORIDE 4 MCG: 100 INJECTION, SOLUTION INTRAVENOUS at 07:31

## 2019-08-15 RX ADMIN — SODIUM CHLORIDE, POTASSIUM CHLORIDE, SODIUM LACTATE AND CALCIUM CHLORIDE: 600; 310; 30; 20 INJECTION, SOLUTION INTRAVENOUS at 07:24

## 2019-08-15 RX ADMIN — MIDAZOLAM 2 MG: 1 INJECTION INTRAMUSCULAR; INTRAVENOUS at 07:24

## 2019-08-15 RX ADMIN — LIDOCAINE HYDROCHLORIDE 20 MG: 20 INJECTION, SOLUTION INFILTRATION; PERINEURAL at 07:27

## 2019-08-15 RX ADMIN — PROPOFOL 75 MCG/KG/MIN: 10 INJECTION, EMULSION INTRAVENOUS at 07:25

## 2019-08-15 RX ADMIN — ONDANSETRON 4 MG: 2 INJECTION INTRAMUSCULAR; INTRAVENOUS at 07:33

## 2019-08-15 ASSESSMENT — MIFFLIN-ST. JEOR: SCORE: 1672.68

## 2019-08-15 ASSESSMENT — ENCOUNTER SYMPTOMS: DYSRHYTHMIAS: 0

## 2019-08-15 NOTE — ANESTHESIA PREPROCEDURE EVALUATION
Anesthesia Pre-Procedure Evaluation    Patient: Cherry Richards   MRN: [id number] : 1965          Preoperative Diagnosis: RIGHT UPPER ARM MASS    Procedure(s):  EXCISION RIGHT UPPER ARM MASS    Past Medical History:   Diagnosis Date     Hypertension     untreated due to pt. refusal     IFG (impaired fasting glucose)      Lipoma of right upper extremity      Overweight      Past Surgical History:   Procedure Laterality Date     LAPAROSCOPIC APPENDECTOMY N/A 2018    Procedure: LAPAROSCOPIC APPENDECTOMY;  LAPAROSCOPIC APPENDECTOMY;  Surgeon: Kassy Thapa MD;  Location:  OR     LAPAROSCOPIC CHOLECYSTECTOMY N/A 2018    Procedure: LAPAROSCOPIC CHOLECYSTECTOMY;  Surgeon: Tae Westbrook MD;  Location:  OR       Anesthesia Evaluation     . Pt has had prior anesthetic. Type: General (Schmidt 2 = grade 1 view)           ROS/MED HX    ENT/Pulmonary:  - neg pulmonary ROS    (-) sleep apnea   Neurologic:  - neg neurologic ROS     Cardiovascular:     (+) hypertension----. : . . . :. . Previous cardiac testing date:results:date: results:ECG reviewed date:10/2018 results:SB at 52 bpm date: results:         (-) CAD, CHF and arrhythmias   METS/Exercise Tolerance:     Hematologic:  - neg hematologic  ROS       Musculoskeletal:  - neg musculoskeletal ROS       GI/Hepatic:  - neg GI/hepatic ROS      (-) GERD   Renal/Genitourinary:  - ROS Renal section negative       Endo: Comment: Impaired fasting glucose        Psychiatric:  - neg psychiatric ROS       Infectious Disease:  - neg infectious disease ROS       Malignancy:         Other:                          Physical Exam  Normal systems: dental    Airway   Mallampati: III  TM distance: >3 FB  Neck ROM: full    Dental   Comment: Prominent incisors    Cardiovascular   Rhythm and rate: regular      Pulmonary    breath sounds clear to auscultation            Lab Results   Component Value Date    WBC 8.1 2019    HGB 16.6 2019    HCT 47.4 2019  "    08/13/2019     10/06/2018    POTASSIUM 3.8 10/06/2018    CHLORIDE 103 10/06/2018    CO2 31 10/06/2018    BUN 20 10/06/2018    CR 1.18 10/06/2018    GLC 87 08/13/2019    TIFFANIE 8.9 10/06/2018    ALBUMIN 4.0 10/06/2018    PROTTOTAL 8.3 10/06/2018    ALT 22 10/06/2018    AST 12 10/06/2018    ALKPHOS 86 10/06/2018    BILITOTAL 0.5 10/06/2018    LIPASE 202 10/06/2018       Preop Vitals  BP Readings from Last 3 Encounters:   08/15/19 (!) 164/87   08/13/19 126/80   08/07/19 120/80    Pulse Readings from Last 3 Encounters:   08/15/19 62   08/13/19 76   08/07/19 59      Resp Readings from Last 3 Encounters:   08/15/19 18   08/13/19 18   07/25/19 18    SpO2 Readings from Last 3 Encounters:   08/15/19 96%   08/13/19 98%   07/25/19 97%      Temp Readings from Last 1 Encounters:   08/15/19 35.8  C (96.4  F) (Oral)    Ht Readings from Last 1 Encounters:   08/15/19 1.727 m (5' 8\")      Wt Readings from Last 1 Encounters:   08/15/19 86.8 kg (191 lb 6.4 oz)    Estimated body mass index is 29.1 kg/m  as calculated from the following:    Height as of this encounter: 1.727 m (5' 8\").    Weight as of this encounter: 86.8 kg (191 lb 6.4 oz).       Anesthesia Plan      History & Physical Review  History and physical reviewed and following examination; no interval change.    ASA Status:  2 .    NPO Status:  > 8 hours    Plan for MAC   PONV prophylaxis:  Ondansetron (or other 5HT-3)       Postoperative Care  Postoperative pain management:  Multi-modal analgesia.      Consents  Anesthetic plan, risks, benefits and alternatives discussed with:  Patient..                 Andrés Jones MD  "

## 2019-08-15 NOTE — BRIEF OP NOTE
General Surgery Brief Operative Note    Pre-operative diagnosis: RIGHT UPPER ARM MASS   Post-operative diagnosis Same, path pending   Procedure: Procedure(s):  EXCISION RIGHT UPPER ARM MASS    Surgeon(s), Assistant(s): Surgeon(s) and Role:     * Jc Sexton MD - Primary     * Stefany Anguiano PA-C - Assisting   Estimated blood loss: 1 mL   Drains: None   Specimens: ID Type Source Tests Collected by Time Destination   A : Right upper arm mass Tissue Arm, Right SURGICAL PATHOLOGY EXAM Jc Sexton MD 8/15/2019  7:53 AM       Findings: See postop diagnosis   Condition: Stable   Comments:      Stefany Anguiano PA-C See dictated operative report for full details

## 2019-08-15 NOTE — ANESTHESIA CARE TRANSFER NOTE
Patient: Cherry Richards    Procedure(s):  EXCISION RIGHT UPPER ARM MASS    Diagnosis: RIGHT UPPER ARM MASS  Diagnosis Additional Information: No value filed.    Anesthesia Type:   MAC     Note:    Patient transferred to:PACU  Handoff Report: Identifed the Patient, Identified the Reponsible Provider, Reviewed the pertinent medical history, Discussed the surgical course, Reviewed Intra-OP anesthesia mangement and issues during anesthesia, Set expectations for post-procedure period and Allowed opportunity for questions and acknowledgement of understanding      Vitals: (Last set prior to Anesthesia Care Transfer)    CRNA VITALS  8/15/2019 0739 - 8/15/2019 0813      8/15/2019             Pulse:  70    SpO2:  98 %    Resp Rate (set):  10                Electronically Signed By: PRASAD Martin CRNA  August 15, 2019  8:13 AM

## 2019-08-15 NOTE — DISCHARGE INSTRUCTIONS
Today you received Toradol, an antiinflammatory medication similar to Ibuprofen.  You should not take other antiinflammatory medication, such as Ibuprofen, Motrin, Advil, Aleve, Naprosyn, etc until 3pm.       Same Day Surgery Discharge Instructions for  Sedation and General Anesthesia       It's not unusual to feel dizzy, light-headed or faint for up to 24 hours after surgery or while taking pain medication.  If you have these symptoms: sit for a few minutes before standing and have someone assist you when you get up to walk or use the bathroom.      You should rest and relax for the next 24 hours. We recommend you make arrangements to have an adult stay with you for at least 24 hours after your discharge.  Avoid hazardous and strenuous activity.      DO NOT DRIVE any vehicle or operate mechanical equipment for 24 hours following the end of your surgery.  Even though you may feel normal, your reactions may be affected by the medication you have received.      Do not drink alcoholic beverages for 24 hours following surgery.       Slowly progress to your regular diet as you feel able. It's not unusual to feel nauseated and/or vomit after receiving anesthesia.  If you develop these symptoms, drink clear liquids (apple juice, ginger ale, broth, 7-up, etc. ) until you feel better.  If your nausea and vomiting persists for 24 hours, please notify your surgeon.        All narcotic pain medications, along with inactivity and anesthesia, can cause constipation. Drinking plenty of liquids and increasing fiber intake will help.      For any questions of a medical nature, call your surgeon.      Do not make important decisions for 24 hours.      If you had general anesthesia, you may have a sore throat for a couple of days related to the breathing tube used during surgery.  You may use Cepacol lozenges to help with this discomfort.  If it worsens or if you develop a fever, contact your surgeon.       If you feel your pain is  not well managed with the pain medications prescribed by your surgeon, please contact your surgeon's office to let them know so they can address your concerns.           [facility]  Discharge Instructions: Post-Operative Excision of Mass/Lesion    ACTIVITY    Take frequent, short walks and increase your activity gradually.      Avoid strenuous physical activity or heavy lifting greater than 15-20 lbs. for 1-2 weeks.  You may climb stairs.    You may drive without restrictions when you are not using any prescription pain medication and feel comfortable in a car.    You may return to work/school when you are comfortable without any prescription pain medication.    WOUND CARE    You may remove your outer dressing or Band-Aids and shower 48 hours after the surgery.  Pat your incisions dry and leave them open to air.  Re-apply dressing (Band-Aids or gauze/tape) as needed for comfort or drainage.    You may have steri-strips (looks like white tape) or Dermabond (looks like glue) on your incisions.  You may peel off the steri-strips 2 weeks after your surgery if they have not peeled off on their own.  If you have Dermabond, it will peel up and fall off on its own.    Do not soak your incisions in a tub or pool for 2 weeks.     Do not apply any lotions, creams, or ointments to your incision(s).    A ridge under your incision(s) is normal and will gradually resolve.    DIET    Start with liquids, then gradually resume your regular diet as tolerated.     Drink plenty of liquids to stay hydrated.    PAIN    Expect some tenderness and discomfort at the incision site(s).  Use the prescribed pain medication at your discretion.  Expect gradual resolution of your pain over several days.    You may take ibuprofen with food (unless you have been told not to) instead of or in addition to your prescribed pain medication.  If you are taking Norco or Percocet, do not take any additional  acetaminophen/APAP/Tylenol.    Do not drink alcohol or drive while you are taking pain medications.    You may apply ice to your incisions in 20 minute intervals as needed for the next 48 hours.  After that time, consider switching to heat if you prefer.    EXPECTATIONS    Pain medications can cause constipation.  Limit use when possible.  Take over the counter stool softener/stimulant, such as Colace or Senna, 1-2 times a day with plenty of water.  You may take a mild over the counter laxative, such as Miralax or a suppository, as needed.      You may discontinue these medications once you are having regular bowel movements and/or are no longer taking your narcotic pain medication.    FOLLOW UP    Our office will contact you approximately 2 weeks to check on your progress and answer any questions you may have.  If you are doing well, you will not need to return for a follow up appointment.  If any concerns are identified over the phone, we will help you make an appointment to see a provider.     If you have not received a phone call, have any questions or concerns, or would like to be seen, please call us at [phone] and ask to speak with our nurse.  We are located at [address] [place] [address].    CALL OUR OFFICE -[phone] IF YOU HAVE:     Chills or fever above 101 F.    Increased redness, warmth, or drainage at your incisions.    Significant bleeding.    Pain not relieved by your pain medication or rest.    Increasing pain after the first 48 hours.    Any other concerns or questions.    Revised January 2018

## 2019-08-15 NOTE — RESULT ENCOUNTER NOTE
.  Please see attached lab results  They are all normal     THE FOLLOWING ARE EXPLANATIONS OF SOME OF OUR LAB TESTS     YOU DID NOT NECESSARILY HAVE ALL OF THESE DONE     Hgb is the blood iron level  WBC means White Blood Cells  Platelets are small blood   cells that help with forming the blood clots along with other blood factors.  Electrolytes are Sodium, Potassium, Calcium, Magnesium, Phosphorus.  Liver tests are: AST, ALT, Bilirubin, Alkaline Phosphatase.  Kidney tests are Creatinine, GFR.  HDL Choles  terol - is the good cholesterol and it is good to have it high.  LDL cholesterol is the bad cholesterol and it is good to have it low.  It is recommended to have LDL less than 130 for people with hypertension and to have it less than 100 for people with   heart disease, diabetes and chronic kidney disease.  Triglycerides are another type of lipid that can cause heart disease, like the cholesterol and should be kept low   Thyroid tests are TSH, T4, T3  Glucose is sugar.  A1c is a test that gives us an idea   about how well was controlled the diabetes for the last 3 months.   PSA stands for Prostate Specific Antigen and it can be elevated with prostate cancer or prostate inflammation.    You are negative for Hepatitis C       Please continue on the same medications    Hope your surgery goes well and you are back to work quickly

## 2019-08-15 NOTE — LETTER
[facility] SAME DAY SURGERY  [address] [place] [address]  Phone: [phone]  Fax: [phone]    August 15, 2019        Cherry Richards  [address] [facility] [address]          To whom it may concern:    RE: Cherry Richards    Surgery was performed today, and he has the following restrictions:            Should not work this coming weekend August 16-18.         Avoid strenuous physical activity or heavy lifting greater than 15-20 lbs. for 1-2 weeks.    Please contact me for questions or concerns.      Sincerely,        Dr. Jc Sexton

## 2019-08-15 NOTE — ANESTHESIA POSTPROCEDURE EVALUATION
Patient: Cherry Richards    Procedure(s):  EXCISION RIGHT UPPER ARM MASS    Diagnosis:RIGHT UPPER ARM MASS  Diagnosis Additional Information: No value filed.    Anesthesia Type:  MAC    Note:  Anesthesia Post Evaluation    Patient location during evaluation: Bedside  Patient participation: Able to fully participate in evaluation  Level of consciousness: awake and alert  Pain management: adequate  Airway patency: patent  Cardiovascular status: acceptable  Respiratory status: acceptable  Hydration status: acceptable  PONV: none             Last vitals:  Vitals:    08/15/19 0900 08/15/19 0915 08/15/19 1029   BP: 115/74 116/70 129/82   Pulse: 50 50 52   Resp: 27 26    Temp:      SpO2: 94% 94% 97%         Electronically Signed By: Andrés Jones MD  August 15, 2019  1:08 PM

## 2019-08-15 NOTE — OP NOTE
General Surgery Operative Note    PREOPERATIVE DIAGNOSIS:  RIGHT UPPER ARM MASS    POSTOPERATIVE DIAGNOSIS:  Same. 12x8x7 cm    PROCEDURE:   Procedure(s):  EXCISION RIGHT UPPER ARM MASS    ANESTHESIA:  MAC      SURGEON:  Jc Sexton MD    ASSISTANT:  Stefany Anguiano PA-C. The physician assistant was medically necessary for their expertise in  suctioning, and retraction    INDICATIONS:  The patient has a large upper right arm mass. He elected to have this removed. The risks and options, including but not limited to bleeding, infection, nerve injury, recurrence, and pain were reviewed with him. He appeared to understand and wished to proceed.    PROCEDURE:  The patient was taken to the operating suite.  The operative area was prepped and draped in a sterile fashion.  Surgeon initiated timeout was acknowledged.  Under sedation, marcaine and lidocaine were infiltrated. A longitudinal incision was made. A large multilobulated mass was dissected out with cautery and sharp dissection. Hemostasis was controlled with cautery. Deep tissues were closed with 3-0 vicryl.   The skin edges were reapproximated with 4-0 Vicryl and Steri-Strips.  The patient was  awakened and taken to the PACU in stable condition.  At the conclusion of the case, all counts were correct.        INTRAOPERATIVE FINDINGS:  [id number] fatty appearing mass    Jc Sexton

## 2019-08-16 LAB — COPATH REPORT: NORMAL

## 2019-09-03 ENCOUNTER — TELEPHONE (OUTPATIENT)
Dept: SURGERY | Facility: CLINIC | Age: 54
End: 2019-09-03

## 2019-09-03 NOTE — TELEPHONE ENCOUNTER
[facility]  Post op call note - No Answer    Cherry Richards was called for an update regarding his recovery.  There was no answer and a message was left instructing the patient to call the office with any questions or concerns.     Stefany Anguiano PA-C

## 2021-04-22 ENCOUNTER — IMMUNIZATION (OUTPATIENT)
Dept: NURSING | Facility: CLINIC | Age: 56
End: 2021-04-22
Payer: COMMERCIAL

## 2021-04-22 PROCEDURE — 91300 PR COVID VAC PFIZER DIL RECON 30 MCG/0.3 ML IM: CPT

## 2021-04-22 PROCEDURE — 0001A PR COVID VAC PFIZER DIL RECON 30 MCG/0.3 ML IM: CPT

## 2021-05-13 ENCOUNTER — IMMUNIZATION (OUTPATIENT)
Dept: NURSING | Facility: CLINIC | Age: 56
End: 2021-05-13
Attending: INTERNAL MEDICINE
Payer: COMMERCIAL

## 2021-05-13 PROCEDURE — 91300 PR COVID VAC PFIZER DIL RECON 30 MCG/0.3 ML IM: CPT

## 2021-05-13 PROCEDURE — 0002A PR COVID VAC PFIZER DIL RECON 30 MCG/0.3 ML IM: CPT

## 2021-08-08 ENCOUNTER — HEALTH MAINTENANCE LETTER (OUTPATIENT)
Age: 56
End: 2021-08-08

## 2022-08-03 ENCOUNTER — OFFICE VISIT (OUTPATIENT)
Dept: FAMILY MEDICINE | Facility: CLINIC | Age: 57
End: 2022-08-03
Payer: COMMERCIAL

## 2022-08-03 VITALS
WEIGHT: 193 LBS | BODY MASS INDEX: 30.29 KG/M2 | SYSTOLIC BLOOD PRESSURE: 142 MMHG | RESPIRATION RATE: 16 BRPM | TEMPERATURE: 97.2 F | HEART RATE: 58 BPM | DIASTOLIC BLOOD PRESSURE: 82 MMHG | HEIGHT: 67 IN | OXYGEN SATURATION: 96 %

## 2022-08-03 DIAGNOSIS — L91.8 ACROCHORDON: ICD-10-CM

## 2022-08-03 DIAGNOSIS — Z13.220 SCREENING FOR HYPERLIPIDEMIA: ICD-10-CM

## 2022-08-03 DIAGNOSIS — Z23 NEED FOR VACCINATION: ICD-10-CM

## 2022-08-03 DIAGNOSIS — Z12.11 SCREEN FOR COLON CANCER: ICD-10-CM

## 2022-08-03 DIAGNOSIS — L98.9 SKIN LESION: ICD-10-CM

## 2022-08-03 DIAGNOSIS — Z12.5 SCREENING FOR PROSTATE CANCER: Primary | ICD-10-CM

## 2022-08-03 DIAGNOSIS — R03.0 ELEVATED BLOOD PRESSURE READING WITHOUT DIAGNOSIS OF HYPERTENSION: ICD-10-CM

## 2022-08-03 PROBLEM — R73.01 IMPAIRED FASTING GLUCOSE: Status: RESOLVED | Noted: 2019-07-25 | Resolved: 2022-08-03

## 2022-08-03 PROBLEM — Z09 S/P APPENDECTOMY, FOLLOW-UP EXAM: Status: RESOLVED | Noted: 2018-10-03 | Resolved: 2022-08-03

## 2022-08-03 LAB
ALBUMIN SERPL-MCNC: 3.8 G/DL (ref 3.4–5)
ALP SERPL-CCNC: 88 U/L (ref 40–150)
ALT SERPL W P-5'-P-CCNC: 21 U/L (ref 0–70)
ANION GAP SERPL CALCULATED.3IONS-SCNC: 2 MMOL/L (ref 3–14)
AST SERPL W P-5'-P-CCNC: 13 U/L (ref 0–45)
BILIRUB SERPL-MCNC: 0.8 MG/DL (ref 0.2–1.3)
BUN SERPL-MCNC: 17 MG/DL (ref 7–30)
CALCIUM SERPL-MCNC: 8.4 MG/DL (ref 8.5–10.1)
CHLORIDE BLD-SCNC: 108 MMOL/L (ref 94–109)
CHOLEST SERPL-MCNC: 166 MG/DL
CO2 SERPL-SCNC: 29 MMOL/L (ref 20–32)
CREAT SERPL-MCNC: 1.13 MG/DL (ref 0.66–1.25)
FASTING STATUS PATIENT QL REPORTED: YES
GFR SERPL CREATININE-BSD FRML MDRD: 75 ML/MIN/1.73M2
GLUCOSE BLD-MCNC: 95 MG/DL (ref 70–99)
HDLC SERPL-MCNC: 50 MG/DL
LDLC SERPL CALC-MCNC: 98 MG/DL
NONHDLC SERPL-MCNC: 116 MG/DL
POTASSIUM BLD-SCNC: 4.5 MMOL/L (ref 3.4–5.3)
PROT SERPL-MCNC: 7.4 G/DL (ref 6.8–8.8)
PSA SERPL-MCNC: 1.75 UG/L (ref 0–4)
SODIUM SERPL-SCNC: 139 MMOL/L (ref 133–144)
TRIGL SERPL-MCNC: 88 MG/DL

## 2022-08-03 PROCEDURE — 99214 OFFICE O/P EST MOD 30 MIN: CPT | Mod: 25 | Performed by: FAMILY MEDICINE

## 2022-08-03 PROCEDURE — 90715 TDAP VACCINE 7 YRS/> IM: CPT | Performed by: FAMILY MEDICINE

## 2022-08-03 PROCEDURE — 36415 COLL VENOUS BLD VENIPUNCTURE: CPT | Performed by: FAMILY MEDICINE

## 2022-08-03 PROCEDURE — 80053 COMPREHEN METABOLIC PANEL: CPT | Performed by: FAMILY MEDICINE

## 2022-08-03 PROCEDURE — 80061 LIPID PANEL: CPT | Performed by: FAMILY MEDICINE

## 2022-08-03 PROCEDURE — 90471 IMMUNIZATION ADMIN: CPT | Performed by: FAMILY MEDICINE

## 2022-08-03 PROCEDURE — G0103 PSA SCREENING: HCPCS | Performed by: FAMILY MEDICINE

## 2022-08-03 ASSESSMENT — PATIENT HEALTH QUESTIONNAIRE - PHQ9
10. IF YOU CHECKED OFF ANY PROBLEMS, HOW DIFFICULT HAVE THESE PROBLEMS MADE IT FOR YOU TO DO YOUR WORK, TAKE CARE OF THINGS AT HOME, OR GET ALONG WITH OTHER PEOPLE: SOMEWHAT DIFFICULT
SUM OF ALL RESPONSES TO PHQ QUESTIONS 1-9: 4
SUM OF ALL RESPONSES TO PHQ QUESTIONS 1-9: 4

## 2022-08-03 ASSESSMENT — PAIN SCALES - GENERAL: PAINLEVEL: NO PAIN (0)

## 2022-08-03 NOTE — LETTER
August 17, 2022      Cherry Richards  [address] [facility] [address]        Dear ,      I have reviewed your recent labs. Here are the results:     -FIT test (screening test for colon cancer) was normal. ADVISE: rechecking this test in 1 year.        Resulted Orders   Lipid panel reflex to direct LDL Non-fasting   Result Value Ref Range    Cholesterol 166 <200 mg/dL    Triglycerides 88 <150 mg/dL    Direct Measure HDL 50 >=40 mg/dL    LDL Cholesterol Calculated 98 <=100 mg/dL    Non HDL Cholesterol 116 <130 mg/dL    Patient Fasting > 8hrs? Yes     Narrative    Cholesterol  Desirable:  <200 mg/dL    Triglycerides  Normal:  Less than 150 mg/dL  Borderline High:  150-199 mg/dL  High:  200-499 mg/dL  Very High:  Greater than or equal to 500 mg/dL    Direct Measure HDL  Female:  Greater than or equal to 50 mg/dL   Male:  Greater than or equal to 40 mg/dL    LDL Cholesterol  Desirable:  <100mg/dL  Above Desirable:  100-129 mg/dL   Borderline High:  130-159 mg/dL   High:  160-189 mg/dL   Very High:  >= 190 mg/dL    Non HDL Cholesterol  Desirable:  130 mg/dL  Above Desirable:  130-159 mg/dL  Borderline High:  160-189 mg/dL  High:  190-219 mg/dL  Very High:  Greater than or equal to 220 mg/dL   Comprehensive metabolic panel (BMP + Alb, Alk Phos, ALT, AST, Total. Bili, TP)   Result Value Ref Range    Sodium 139 133 - 144 mmol/L    Potassium 4.5 3.4 - 5.3 mmol/L    Chloride 108 94 - 109 mmol/L    Carbon Dioxide (CO2) 29 20 - 32 mmol/L    Anion Gap 2 (L) 3 - 14 mmol/L    Urea Nitrogen 17 7 - 30 mg/dL    Creatinine 1.13 0.66 - 1.25 mg/dL    Calcium 8.4 (L) 8.5 - 10.1 mg/dL    Glucose 95 70 - 99 mg/dL    Alkaline Phosphatase 88 40 - 150 U/L    AST 13 0 - 45 U/L    ALT 21 0 - 70 U/L    Protein Total 7.4 6.8 - 8.8 g/dL    Albumin 3.8 3.4 - 5.0 g/dL    Bilirubin Total 0.8 0.2 - 1.3 mg/dL    GFR Estimate 75 >60 mL/min/1.73m2      Comment:      Effective December 21, 2021 eGFRcr in adults is calculated using the  2021 CKD-EPI creatinine equation which includes age and gender (Dora et al., NEJM, DOI: 10.1056/DQLEmw7876888)   PSA, screen   Result Value Ref Range    Prostate Specific Antigen Screen 1.75 0.00 - 4.00 ug/L    Narrative    Assay Method:  Chemiluminescence using Siemens   Vista analyzer.   Fecal colorectal cancer screen (FIT)   Result Value Ref Range    Occult Blood Screen FIT Negative Negative       If you have any questions or concerns, please call the clinic at the number listed above.       Sincerely,      Renzo Montelongo MD

## 2022-08-03 NOTE — PROGRESS NOTES
"  Assessment & Plan     Screen for colon cancer    - Fecal colorectal cancer screen (FIT); Future  - Fecal colorectal cancer screen (FIT)    Screening for hyperlipidemia    - Lipid panel reflex to direct LDL Non-fasting; Future  - Comprehensive metabolic panel (BMP + Alb, Alk Phos, ALT, AST, Total. Bili, TP); Future  - Lipid panel reflex to direct LDL Non-fasting  - Comprehensive metabolic panel (BMP + Alb, Alk Phos, ALT, AST, Total. Bili, TP)    Screening for prostate cancer    - PSA, screen; Future  - PSA, screen    Elevated blood pressure reading without diagnosis of hypertension  Discussed with the patient about elevated blood pressure.  Recheck was also elevated but he may have underlying hypertension however patient would like to do some conservative management.  We discussed about lifestyle changes including diet and exercise.  Advised to follow-up in 2 to 3 months after doing conservative management and lifestyle changes.  If symptoms does not improve we may need to consider blood pressure medication.  Patient is somewhat reluctant in starting any blood pressure medicine this time.    Skin lesion    - Adult Dermatology Referral; Future    Need for vaccination    - TDAP VACCINE (Adacel, Boostrix)  [[id number]]    Acrochordon  Most likely pedunculated skin tag.  Suggested due to size see dermatology for appropriate removal.  Benign nature of this is discussed with the patient  - Adult Dermatology Referral; Future             BMI:   Estimated body mass index is 29.94 kg/m  as calculated from the following:    Height as of this encounter: 1.71 m (5' 7.32\").    Weight as of this encounter: 87.5 kg (193 lb).           Return in about 3 months (around 11/3/2022) for Physical Exam.    Renzo Montelongo MD  [facility]   Carey is a 57 year old, presenting for the following health issues:  Derm Problem (Wart on right tight)      Patient came today to discuss few issues.  One of them is " "being pedunculated skin tag on the inner side of the thigh.  Which has been present for years he does not think it is getting any bigger.  He has tried to remove it at home but not able to completely or partially removed it.    History of high blood pressure without diagnosis.  He has high blood pressure in the past but never been on any medications.  Denies any chest pains no shortness of breath.    History of Present Illness       Reason for visit:  Wart on my leg    He eats 2-3 servings of fruits and vegetables daily.He consumes 1 sweetened beverage(s) daily.He exercises with enough effort to increase his heart rate 10 to 19 minutes per day.  He exercises with enough effort to increase his heart rate 3 or less days per week.   He is taking medications regularly.    Today's PHQ-9         PHQ-9 Total Score: 4    PHQ-9 Q9 Thoughts of better off dead/self-harm past 2 weeks :   Not at all    How difficult have these problems made it for you to do your work, take care of things at home, or get along with other people: Somewhat difficult         Review of Systems   Constitutional, HEENT, cardiovascular, pulmonary, gi and gu systems are negative, except as otherwise noted.      Objective    BP (!) 146/84   Pulse 58   Temp 97.2  F (36.2  C) (Tympanic)   Resp 16   Ht 1.71 m (5' 7.32\")   Wt 87.5 kg (193 lb)   SpO2 96%   BMI 29.94 kg/m    Body mass index is 29.94 kg/m .  Physical Exam   GENERAL: healthy, alert and no distress  NECK: no adenopathy, no asymmetry, masses, or scars and thyroid normal to palpation  RESP: lungs clear to auscultation - no rales, rhonchi or wheezes  CV: regular rate and rhythm, normal S1 S2, no S3 or S4, no murmur, click or rub, no peripheral edema and peripheral pulses strong  ABDOMEN: soft, nontender, no hepatosplenomegaly, no masses and bowel sounds normal  MS: no gross musculoskeletal defects noted, no edema  Pedunculated skin tag attached to the inner thigh.            .  ..  "

## 2022-08-12 LAB — HEMOCCULT STL QL IA: NEGATIVE

## 2022-08-12 PROCEDURE — 82274 ASSAY TEST FOR BLOOD FECAL: CPT | Performed by: FAMILY MEDICINE

## 2022-09-04 ENCOUNTER — HEALTH MAINTENANCE LETTER (OUTPATIENT)
Age: 57
End: 2022-09-04

## 2022-10-10 ENCOUNTER — OFFICE VISIT (OUTPATIENT)
Dept: DERMATOLOGY | Facility: CLINIC | Age: 57
End: 2022-10-10
Payer: COMMERCIAL

## 2022-10-10 DIAGNOSIS — D48.5 NEOPLASM OF UNCERTAIN BEHAVIOR OF SKIN: Primary | ICD-10-CM

## 2022-10-10 PROCEDURE — 11102 TANGNTL BX SKIN SINGLE LES: CPT | Performed by: STUDENT IN AN ORGANIZED HEALTH CARE EDUCATION/TRAINING PROGRAM

## 2022-10-10 PROCEDURE — 88305 TISSUE EXAM BY PATHOLOGIST: CPT | Performed by: PATHOLOGY

## 2022-10-10 ASSESSMENT — PAIN SCALES - GENERAL: PAINLEVEL: NO PAIN (0)

## 2022-10-10 NOTE — LETTER
10/10/2022         RE: Cherry Richards  [address] [address]        Dear Colleague,    Thank you for referring your patient, Cherry iRchards, to the [facility]. Please see a copy of my visit note below.    [facility] Dermatology Note    Encounter Date: Oct 10, 2022    Dermatology Problem List:  -   ______________________________________    Impression/Plan:  Cherry was seen today for derm problem.    Diagnoses and all orders for this visit:    Neoplasm of uncertain behavior of skin  -     SHAVE 1 [[id number]]  -     Dermatological Path Order and Indications; Standing  -     Dermatological Path Order and Indications  - shave bx R thigh ddx acrochordon vs other   Other orders  -     Adult Dermatology Referral        - SHAVE BIOPSY PROCEDURE NOTE: After written informed consent was obtained, a time out was taken to identify the patient and the correct site for biopsy. The lesion on the R thigh  was cleansed with a 70% isopropyl alcohol wipe, and then injected with 1cc of lidocaine 1% with epinephrine 1:100,000. Once anesthesia was ensured, the visible surface of the lesion was biopsied using a Santa Claus blade in standard technique. Hemostasis was obtained with pressure and aluminum chloride 20% solution. The specimen was placed in a labeled formalin container and sent to pathology for sectioning and analysis. The wound was dressed with white petrolatum and an adhesive bandage. The patient tolerated the procedure well. Post-procedure instructions and recommendations were provided both verbally and in writing.    Follow-up PRN.       Staff Involved:  Staff Only    Chinedu Amin MD   of Dermatology  Department of Dermatology  [facility]      CC:   Chief Complaint   Patient presents with     Derm Problem     Skin tag removed on R thigh        History of Present Illness:  Mr. Cherry Richards is a  57 year old male who presents as a new patient.    Growth R leg present for years slowly growing     Labs:      Physical exam:  Vitals: There were no vitals taken for this visit.  GEN: well developed, well-nourished, in no acute distress, in a pleasant mood.     SKIN: Prather phototype 2  - Focused examination of the R leg was performed.  - pedunculated nodule R thigh ~2.5 cm   - No other lesions of concern on areas examined.     Past Medical History:   Past Medical History:   Diagnosis Date     Hypertension     untreated due to pt. refusal     IFG (impaired fasting glucose)      Lipoma of right upper extremity      Overweight      Past Surgical History:   Procedure Laterality Date     EXCISE MASS UPPER EXTREMITY Right 8/15/2019    Procedure: EXCISION RIGHT UPPER ARM MASS;  Surgeon: Jc Sexton MD;  Location:  OR     LAPAROSCOPIC APPENDECTOMY N/A 9/29/2018    Procedure: LAPAROSCOPIC APPENDECTOMY;  LAPAROSCOPIC APPENDECTOMY;  Surgeon: Kassy Thapa MD;  Location:  OR     LAPAROSCOPIC CHOLECYSTECTOMY N/A 12/6/2018    Procedure: LAPAROSCOPIC CHOLECYSTECTOMY;  Surgeon: Tae Westbrook MD;  Location:  OR       Social History:   reports that he quit smoking about 34 years ago. His smoking use included cigarettes. He has a 3.00 pack-year smoking history. He has quit using smokeless tobacco. He reports that he does not drink alcohol and does not use drugs.    Family History:  Family History   Problem Relation Age of Onset     Unknown/Adopted Mother      Unknown/Adopted Father      Family History Negative No family hx of        Medications:  Current Outpatient Medications   Medication Sig Dispense Refill     ASPIRIN PO Take 325 mg by mouth daily as needed  (Patient not taking: Reported on 10/10/2022)       No Known Allergies                Again, thank you for allowing me to participate in the care of your patient.        Sincerely,        Chinedu Amin MD

## 2022-10-10 NOTE — PROGRESS NOTES
[facility] Dermatology Note    Encounter Date: Oct 10, 2022    Dermatology Problem List:  -   ______________________________________    Impression/Plan:  Cherry was seen today for derm problem.    Diagnoses and all orders for this visit:    Neoplasm of uncertain behavior of skin  -     SHAVE 1 [[id number]]  -     Dermatological Path Order and Indications; Standing  -     Dermatological Path Order and Indications  - shave bx R thigh ddx acrochordon vs other   Other orders  -     Adult Dermatology Referral        - SHAVE BIOPSY PROCEDURE NOTE: After written informed consent was obtained, a time out was taken to identify the patient and the correct site for biopsy. The lesion on the R thigh  was cleansed with a 70% isopropyl alcohol wipe, and then injected with 1cc of lidocaine 1% with epinephrine 1:100,000. Once anesthesia was ensured, the visible surface of the lesion was biopsied using a Charlotte blade in standard technique. Hemostasis was obtained with pressure and aluminum chloride 20% solution. The specimen was placed in a labeled formalin container and sent to pathology for sectioning and analysis. The wound was dressed with white petrolatum and an adhesive bandage. The patient tolerated the procedure well. Post-procedure instructions and recommendations were provided both verbally and in writing.    Follow-up PRN.       Staff Involved:  Staff Only    Chinedu Amin MD   of Dermatology  Department of Dermatology  [facility] [employer] [facility]      CC:   Chief Complaint   Patient presents with     Derm Problem     Skin tag removed on R thigh        History of Present Illness:  Mr. Cherry Richards is a 57 year old male who presents as a new patient.    Growth R leg present for years slowly growing     Labs:      Physical exam:  Vitals: There were no vitals taken for this visit.  GEN: well developed, well-nourished, in no acute distress, in a pleasant mood.      SKIN: Prather phototype 2  - Focused examination of the R leg was performed.  - pedunculated nodule R thigh ~2.5 cm   - No other lesions of concern on areas examined.     Past Medical History:   Past Medical History:   Diagnosis Date     Hypertension     untreated due to pt. refusal     IFG (impaired fasting glucose)      Lipoma of right upper extremity      Overweight      Past Surgical History:   Procedure Laterality Date     EXCISE MASS UPPER EXTREMITY Right 8/15/2019    Procedure: EXCISION RIGHT UPPER ARM MASS;  Surgeon: Jc Sexton MD;  Location:  OR     LAPAROSCOPIC APPENDECTOMY N/A 9/29/2018    Procedure: LAPAROSCOPIC APPENDECTOMY;  LAPAROSCOPIC APPENDECTOMY;  Surgeon: Kassy Thapa MD;  Location:  OR     LAPAROSCOPIC CHOLECYSTECTOMY N/A 12/6/2018    Procedure: LAPAROSCOPIC CHOLECYSTECTOMY;  Surgeon: Tae Westbrook MD;  Location:  OR       Social History:   reports that he quit smoking about 34 years ago. His smoking use included cigarettes. He has a 3.00 pack-year smoking history. He has quit using smokeless tobacco. He reports that he does not drink alcohol and does not use drugs.    Family History:  Family History   Problem Relation Age of Onset     Unknown/Adopted Mother      Unknown/Adopted Father      Family History Negative No family hx of        Medications:  Current Outpatient Medications   Medication Sig Dispense Refill     ASPIRIN PO Take 325 mg by mouth daily as needed  (Patient not taking: Reported on 10/10/2022)       No Known Allergies

## 2022-10-10 NOTE — PATIENT INSTRUCTIONS
Wound Care Instructions     FOR SUPERFICIAL WOUNDS     [facility] [phone]                 AFTER 24 HOURS YOU SHOULD REMOVE THE BANDAGE AND BEGIN DAILY DRESSING CHANGES AS FOLLOWS:     1) Remove Dressing.     2) Clean and dry the area with tap water using a Q-tip or sterile gauze pad.     3) Apply Vaseline, Aquaphor, Polysporin ointment or Bacitracin ointment over entire wound.  Do NOT use Neosporin ointment.     4) Cover the wound with a band-aid, or a sterile non-stick gauze pad and micropore paper tape    REPEAT THESE INSTRUCTIONS AT LEAST ONCE A DAY UNTIL THE WOUND HAS COMPLETELY HEALED.    It is an old wives tale that a wound heals better when it is exposed to air and allowed to dry out. The wound will heal faster with a better cosmetic result if it is kept moist with ointment and covered with a bandage.    **Do not let the wound dry out.**    Supplies Needed:      *Cotton tipped applicators (Q-tips)    *Vaseline, Aquaphor, Polysporin or Bacitracin Ointment (NOT NEOSPORIN)    *Band-aids or non-stick gauze pads and micropore paper tape.      PATIENT INFORMATION:    During the healing process you will notice a number of changes. All wounds develop a small halo of redness surrounding the wound.  This means healing is occurring. Severe itching with extensive redness usually indicates sensitivity to the ointment or bandage tape used to dress the wound.  You should call our office if this develops.      Swelling  and/or discoloration around your surgical site is common, particularly when performed around the eye.    All wounds normally drain.  The larger the wound the more drainage there will be.  After 7-10 days, you will notice the wound beginning to shrink and new skin will begin to grow.  The wound is healed when you can see skin has formed over the entire area.  A healed wound has a healthy, shiny look to the surface and is red to dark pink in color to normalize.  Wounds may take approximately  4-6 weeks to heal.  Larger wounds may take 6-8 weeks.  After the wound is healed you may discontinue dressing changes.    You may experience a sensation of tightness as your wound heals. This is normal and will gradually subside.    Your healed wound may be sensitive to temperature changes. This sensitivity improves with time, but if you re having a lot of discomfort, try to avoid temperature extremes.    Patients frequently experience itching after their wound appears to have healed because of the continue healing under the skin.  Plain Vaseline will help relieve the itching.      POSSIBLE COMPLICATIONS    BLEEDING:    Leave the bandage in place.  Use tightly rolled up gauze or a cloth to apply direct pressure over the bandage for 30  minutes.  Reapply pressure for an additional 30 minutes if necessary  Use additional gauze and tape to maintain pressure once the bleeding has stopped.

## 2022-10-12 LAB
PATH REPORT.COMMENTS IMP SPEC: NORMAL
PATH REPORT.COMMENTS IMP SPEC: NORMAL
PATH REPORT.FINAL DX SPEC: NORMAL
PATH REPORT.GROSS SPEC: NORMAL
PATH REPORT.MICROSCOPIC SPEC OTHER STN: NORMAL
PATH REPORT.RELEVANT HX SPEC: NORMAL

## 2023-10-01 ENCOUNTER — HEALTH MAINTENANCE LETTER (OUTPATIENT)
Age: 58
End: 2023-10-01

## 2025-01-11 ENCOUNTER — HEALTH MAINTENANCE LETTER (OUTPATIENT)
Age: 60
End: 2025-01-11

## (undated) DEVICE — PACK LAP CHOLE SLC15LCFSD

## (undated) DEVICE — PACK MINOR SBA15MIFSE

## (undated) DEVICE — GLOVE GAMMEX DERMAPRENE ULTRA SZ 8 LF 8516

## (undated) DEVICE — DRSG STERI STRIP 1/2X4" R1547

## (undated) DEVICE — SUCTION IRR STRYKERFLOW II W/TIP 250-070-520

## (undated) DEVICE — SU VICRYL 0 UR-6 27" J603H

## (undated) DEVICE — LINEN TOWEL PACK X5 5464

## (undated) DEVICE — SOL WATER IRRIG 1000ML BOTTLE 2F7114

## (undated) DEVICE — SU VICRYL 3-0 PS-2 27" UND J427H

## (undated) DEVICE — ESU GROUND PAD UNIVERSAL W/O CORD

## (undated) DEVICE — ENDO TROCAR FIRST ENTRY KII FIOS Z-THRD 12X100MM CTF73

## (undated) DEVICE — SOL NACL 0.9% IRRIG 1000ML BOTTLE 2F7124

## (undated) DEVICE — CLIP APPLIER ENDO 5MM M/L LIGAMAX EL5ML

## (undated) DEVICE — SU MONOCRYL 4-0 PS-2 18" UND Y496G

## (undated) DEVICE — SOL NACL 0.9% INJ 1000ML BAG 2B1324X

## (undated) DEVICE — ESU CORD MONOPOLAR 10'  E0510

## (undated) DEVICE — STPL POWERED ECHELON 45MM PSEE45A

## (undated) DEVICE — SUCTION CANISTER MEDIVAC LINER 3000ML W/LID 65651-530

## (undated) DEVICE — PREP CHLORAPREP 26ML TINTED ORANGE  260815

## (undated) DEVICE — ENDO TROCAR SLEEVE KII Z-THREADED 05X100MM CTS02

## (undated) DEVICE — ESU HOLDER LAP INST DISP PURPLE LONG 330MM H-PRO-330

## (undated) DEVICE — SU VICRYL 4-0 PS-2 18" UND J496H

## (undated) DEVICE — ENDO POUCH UNIV RETRIEVAL SYSTEM INZII 10MM CD001

## (undated) DEVICE — DECANTER VIAL 2006S

## (undated) DEVICE — ENDO TROCAR FIRST ENTRY KII FIOS Z-THRD 05X100MM CTF03

## (undated) DEVICE — SU VICRYL 3-0 SH 27" J316H

## (undated) DEVICE — GLOVE PROTEXIS W/NEU-THERA 7.5  2D73TE75

## (undated) DEVICE — GLOVE PROTEXIS BLUE W/NEU-THERA 6.5  2D73EB65

## (undated) DEVICE — STPL RELOAD REG TISSUE ECHELON 45 X 3.6MM BLUE GST45B

## (undated) DEVICE — DRAPE EXTREMITY W/ARMBOARD 29405

## (undated) DEVICE — GLOVE GAMMEX DERMAPRENE ULTRA SZ 8.5 LF 8517

## (undated) DEVICE — BLADE CLIPPER 4406

## (undated) DEVICE — GLOVE PROTEXIS MICRO 6.0  2D73PM60

## (undated) DEVICE — DEVICE SUTURE GRASPER TROCAR CLOSURE 14GA PMITCSG

## (undated) RX ORDER — HYDROMORPHONE HYDROCHLORIDE 1 MG/ML
INJECTION, SOLUTION INTRAMUSCULAR; INTRAVENOUS; SUBCUTANEOUS
Status: DISPENSED
Start: 2018-12-06

## (undated) RX ORDER — PROPOFOL 10 MG/ML
INJECTION, EMULSION INTRAVENOUS
Status: DISPENSED
Start: 2018-12-06

## (undated) RX ORDER — FENTANYL CITRATE 50 UG/ML
INJECTION, SOLUTION INTRAMUSCULAR; INTRAVENOUS
Status: DISPENSED
Start: 2018-12-06

## (undated) RX ORDER — PROPOFOL 10 MG/ML
INJECTION, EMULSION INTRAVENOUS
Status: DISPENSED
Start: 2018-09-29

## (undated) RX ORDER — LIDOCAINE HYDROCHLORIDE 20 MG/ML
INJECTION, SOLUTION EPIDURAL; INFILTRATION; INTRACAUDAL; PERINEURAL
Status: DISPENSED
Start: 2018-12-06

## (undated) RX ORDER — LIDOCAINE HYDROCHLORIDE 10 MG/ML
INJECTION, SOLUTION INFILTRATION; PERINEURAL
Status: DISPENSED
Start: 2018-12-06

## (undated) RX ORDER — FENTANYL CITRATE 50 UG/ML
INJECTION, SOLUTION INTRAMUSCULAR; INTRAVENOUS
Status: DISPENSED
Start: 2018-09-29

## (undated) RX ORDER — FENTANYL CITRATE 50 UG/ML
INJECTION, SOLUTION INTRAMUSCULAR; INTRAVENOUS
Status: DISPENSED
Start: 2019-08-15

## (undated) RX ORDER — LIDOCAINE HYDROCHLORIDE 20 MG/ML
INJECTION, SOLUTION EPIDURAL; INFILTRATION; INTRACAUDAL; PERINEURAL
Status: DISPENSED
Start: 2019-08-15

## (undated) RX ORDER — HYDROCODONE BITARTRATE AND ACETAMINOPHEN 5; 325 MG/1; MG/1
TABLET ORAL
Status: DISPENSED
Start: 2018-12-06

## (undated) RX ORDER — ONDANSETRON 2 MG/ML
INJECTION INTRAMUSCULAR; INTRAVENOUS
Status: DISPENSED
Start: 2019-08-15

## (undated) RX ORDER — CEFAZOLIN SODIUM 2 G/100ML
INJECTION, SOLUTION INTRAVENOUS
Status: DISPENSED
Start: 2018-12-06

## (undated) RX ORDER — KETOROLAC TROMETHAMINE 30 MG/ML
INJECTION, SOLUTION INTRAMUSCULAR; INTRAVENOUS
Status: DISPENSED
Start: 2019-08-15

## (undated) RX ORDER — GLYCOPYRROLATE 0.2 MG/ML
INJECTION, SOLUTION INTRAMUSCULAR; INTRAVENOUS
Status: DISPENSED
Start: 2018-09-29

## (undated) RX ORDER — NEOSTIGMINE METHYLSULFATE 1 MG/ML
VIAL (ML) INJECTION
Status: DISPENSED
Start: 2018-09-29

## (undated) RX ORDER — PROPOFOL 10 MG/ML
INJECTION, EMULSION INTRAVENOUS
Status: DISPENSED
Start: 2019-08-15

## (undated) RX ORDER — BUPIVACAINE HYDROCHLORIDE 2.5 MG/ML
INJECTION, SOLUTION EPIDURAL; INFILTRATION; INTRACAUDAL
Status: DISPENSED
Start: 2018-12-06

## (undated) RX ORDER — ONDANSETRON 2 MG/ML
INJECTION INTRAMUSCULAR; INTRAVENOUS
Status: DISPENSED
Start: 2018-09-29

## (undated) RX ORDER — GLYCOPYRROLATE 0.2 MG/ML
INJECTION, SOLUTION INTRAMUSCULAR; INTRAVENOUS
Status: DISPENSED
Start: 2018-12-06

## (undated) RX ORDER — LIDOCAINE HYDROCHLORIDE 20 MG/ML
INJECTION, SOLUTION EPIDURAL; INFILTRATION; INTRACAUDAL; PERINEURAL
Status: DISPENSED
Start: 2018-09-29

## (undated) RX ORDER — EPINEPHRINE 1 MG/ML
INJECTION, SOLUTION INTRAMUSCULAR; SUBCUTANEOUS
Status: DISPENSED
Start: 2018-12-06

## (undated) RX ORDER — DEXAMETHASONE SODIUM PHOSPHATE 4 MG/ML
INJECTION, SOLUTION INTRA-ARTICULAR; INTRALESIONAL; INTRAMUSCULAR; INTRAVENOUS; SOFT TISSUE
Status: DISPENSED
Start: 2018-09-29